# Patient Record
Sex: FEMALE | Race: WHITE | Employment: OTHER | URBAN - METROPOLITAN AREA
[De-identification: names, ages, dates, MRNs, and addresses within clinical notes are randomized per-mention and may not be internally consistent; named-entity substitution may affect disease eponyms.]

---

## 2017-01-12 ENCOUNTER — HOSPITAL ENCOUNTER (OUTPATIENT)
Dept: ULTRASOUND IMAGING | Age: 82
Discharge: HOME | End: 2017-01-12
Attending: NURSE PRACTITIONER | Admitting: NURSE PRACTITIONER

## 2017-01-12 ENCOUNTER — OFFICE VISIT (OUTPATIENT)
Dept: FAMILY MEDICINE CLINIC | Age: 82
End: 2017-01-12

## 2017-01-12 VITALS
BODY MASS INDEX: 26.36 KG/M2 | DIASTOLIC BLOOD PRESSURE: 60 MMHG | SYSTOLIC BLOOD PRESSURE: 122 MMHG | HEIGHT: 57 IN | WEIGHT: 122.2 LBS | OXYGEN SATURATION: 97 % | RESPIRATION RATE: 18 BRPM | HEART RATE: 93 BPM

## 2017-01-12 DIAGNOSIS — T50.905A MEDICATION ADVERSE EFFECT, INITIAL ENCOUNTER: Primary | ICD-10-CM

## 2017-01-12 DIAGNOSIS — R60.0 LOCALIZED EDEMA: ICD-10-CM

## 2017-01-12 PROCEDURE — 99214 OFFICE O/P EST MOD 30 MIN: CPT | Performed by: NURSE PRACTITIONER

## 2017-01-12 PROCEDURE — 96372 THER/PROPH/DIAG INJ SC/IM: CPT | Performed by: NURSE PRACTITIONER

## 2017-01-12 RX ORDER — METHYLPREDNISOLONE ACETATE 40 MG/ML
40 INJECTION, SUSPENSION INTRA-ARTICULAR; INTRALESIONAL; INTRAMUSCULAR; SOFT TISSUE ONCE
Status: COMPLETED | OUTPATIENT
Start: 2017-01-12 | End: 2017-01-12

## 2017-01-12 RX ADMIN — METHYLPREDNISOLONE ACETATE 40 MG: 40 INJECTION, SUSPENSION INTRA-ARTICULAR; INTRALESIONAL; INTRAMUSCULAR; SOFT TISSUE at 11:33

## 2017-01-12 ASSESSMENT — ENCOUNTER SYMPTOMS
SHORTNESS OF BREATH: 0
BACK PAIN: 0
NAUSEA: 0
VOMITING: 0
ABDOMINAL DISTENTION: 0

## 2017-01-13 ASSESSMENT — PATIENT HEALTH QUESTIONNAIRE - PHQ9
SUM OF ALL RESPONSES TO PHQ9 QUESTIONS 1 & 2: 0
2. FEELING DOWN, DEPRESSED OR HOPELESS: 0
1. LITTLE INTEREST OR PLEASURE IN DOING THINGS: 0
SUM OF ALL RESPONSES TO PHQ QUESTIONS 1-9: 0

## 2017-01-26 ENCOUNTER — OFFICE VISIT (OUTPATIENT)
Dept: FAMILY MEDICINE CLINIC | Age: 82
End: 2017-01-26

## 2017-01-26 VITALS
HEART RATE: 71 BPM | WEIGHT: 120 LBS | SYSTOLIC BLOOD PRESSURE: 120 MMHG | BODY MASS INDEX: 25.97 KG/M2 | OXYGEN SATURATION: 94 % | DIASTOLIC BLOOD PRESSURE: 74 MMHG | RESPIRATION RATE: 18 BRPM

## 2017-01-26 DIAGNOSIS — R60.0 LEG EDEMA, LEFT: Primary | ICD-10-CM

## 2017-01-26 DIAGNOSIS — M81.0 OSTEOPOROSIS: ICD-10-CM

## 2017-01-26 PROCEDURE — 99213 OFFICE O/P EST LOW 20 MIN: CPT | Performed by: NURSE PRACTITIONER

## 2017-01-26 ASSESSMENT — ENCOUNTER SYMPTOMS: SHORTNESS OF BREATH: 0

## 2017-03-08 ENCOUNTER — OFFICE VISIT (OUTPATIENT)
Dept: FAMILY MEDICINE CLINIC | Age: 82
End: 2017-03-08

## 2017-03-08 DIAGNOSIS — M81.0 OSTEOPOROSIS: Primary | ICD-10-CM

## 2017-03-08 DIAGNOSIS — Z12.11 SCREENING FOR COLON CANCER: ICD-10-CM

## 2017-03-08 PROCEDURE — 99214 OFFICE O/P EST MOD 30 MIN: CPT | Performed by: NURSE PRACTITIONER

## 2017-03-09 ENCOUNTER — TELEPHONE (OUTPATIENT)
Dept: FAMILY MEDICINE CLINIC | Age: 82
End: 2017-03-09

## 2017-03-09 VITALS
HEART RATE: 61 BPM | WEIGHT: 123 LBS | HEIGHT: 57 IN | OXYGEN SATURATION: 97 % | BODY MASS INDEX: 26.54 KG/M2 | DIASTOLIC BLOOD PRESSURE: 58 MMHG | SYSTOLIC BLOOD PRESSURE: 112 MMHG | RESPIRATION RATE: 17 BRPM

## 2017-03-09 ASSESSMENT — PATIENT HEALTH QUESTIONNAIRE - PHQ9
SUM OF ALL RESPONSES TO PHQ9 QUESTIONS 1 & 2: 0
1. LITTLE INTEREST OR PLEASURE IN DOING THINGS: 0
SUM OF ALL RESPONSES TO PHQ QUESTIONS 1-9: 0
2. FEELING DOWN, DEPRESSED OR HOPELESS: 0

## 2017-03-09 ASSESSMENT — ENCOUNTER SYMPTOMS
ABDOMINAL DISTENTION: 0
SHORTNESS OF BREATH: 0
BACK PAIN: 0
VOMITING: 0
NAUSEA: 0

## 2017-03-10 DIAGNOSIS — Z13.820 SCREENING FOR OSTEOPOROSIS: Primary | ICD-10-CM

## 2017-03-14 DIAGNOSIS — Z13.820 SCREENING FOR OSTEOPOROSIS: ICD-10-CM

## 2017-03-20 ENCOUNTER — TELEPHONE (OUTPATIENT)
Dept: FAMILY MEDICINE CLINIC | Age: 82
End: 2017-03-20

## 2017-03-20 DIAGNOSIS — Z12.11 SCREENING FOR COLON CANCER: ICD-10-CM

## 2017-03-20 LAB
CONTROL: ABNORMAL
HEMOCCULT STL QL: ABNORMAL

## 2017-03-20 PROCEDURE — 82274 ASSAY TEST FOR BLOOD FECAL: CPT | Performed by: NURSE PRACTITIONER

## 2017-03-22 DIAGNOSIS — R89.9 ABNORMAL LABORATORY TEST: Primary | ICD-10-CM

## 2017-03-23 RX ORDER — POTASSIUM CHLORIDE 20 MEQ/1
TABLET, EXTENDED RELEASE ORAL
Qty: 120 TABLET | Refills: 1 | Status: SHIPPED | OUTPATIENT
Start: 2017-03-23 | End: 2019-06-21

## 2017-03-27 ENCOUNTER — TELEPHONE (OUTPATIENT)
Dept: CARDIOLOGY CLINIC | Age: 82
End: 2017-03-27

## 2017-04-11 RX ORDER — FLUTICASONE PROPIONATE 50 MCG
SPRAY, SUSPENSION (ML) NASAL
Qty: 48 G | Refills: 1 | Status: SHIPPED | OUTPATIENT
Start: 2017-04-11 | End: 2020-05-07

## 2017-04-25 RX ORDER — LISINOPRIL 10 MG/1
TABLET ORAL
Qty: 90 TABLET | Refills: 10 | Status: SHIPPED | OUTPATIENT
Start: 2017-04-25 | End: 2019-11-05

## 2017-05-09 ENCOUNTER — OFFICE VISIT (OUTPATIENT)
Dept: CARDIOLOGY CLINIC | Age: 82
End: 2017-05-09

## 2017-05-09 VITALS
HEIGHT: 57 IN | BODY MASS INDEX: 26.71 KG/M2 | DIASTOLIC BLOOD PRESSURE: 78 MMHG | HEART RATE: 60 BPM | WEIGHT: 123.8 LBS | SYSTOLIC BLOOD PRESSURE: 116 MMHG

## 2017-05-09 DIAGNOSIS — I25.10 CORONARY ARTERY DISEASE INVOLVING NATIVE CORONARY ARTERY OF NATIVE HEART WITHOUT ANGINA PECTORIS: Primary | ICD-10-CM

## 2017-05-09 PROCEDURE — 99214 OFFICE O/P EST MOD 30 MIN: CPT | Performed by: INTERNAL MEDICINE

## 2017-05-15 ENCOUNTER — TELEPHONE (OUTPATIENT)
Dept: FAMILY MEDICINE CLINIC | Age: 82
End: 2017-05-15

## 2017-06-07 ENCOUNTER — OFFICE VISIT (OUTPATIENT)
Dept: FAMILY MEDICINE CLINIC | Age: 82
End: 2017-06-07

## 2017-06-07 VITALS
HEIGHT: 57 IN | OXYGEN SATURATION: 99 % | SYSTOLIC BLOOD PRESSURE: 118 MMHG | HEART RATE: 68 BPM | WEIGHT: 122.2 LBS | BODY MASS INDEX: 26.36 KG/M2 | DIASTOLIC BLOOD PRESSURE: 58 MMHG

## 2017-06-07 DIAGNOSIS — M25.552 CHRONIC ARTHRALGIAS OF KNEES AND HIPS: Primary | ICD-10-CM

## 2017-06-07 DIAGNOSIS — M25.562 CHRONIC ARTHRALGIAS OF KNEES AND HIPS: Primary | ICD-10-CM

## 2017-06-07 DIAGNOSIS — Z13.1 SCREENING FOR DIABETES MELLITUS: ICD-10-CM

## 2017-06-07 DIAGNOSIS — M25.551 CHRONIC ARTHRALGIAS OF KNEES AND HIPS: Primary | ICD-10-CM

## 2017-06-07 DIAGNOSIS — G89.29 CHRONIC ARTHRALGIAS OF KNEES AND HIPS: Primary | ICD-10-CM

## 2017-06-07 DIAGNOSIS — E78.5 HYPERLIPIDEMIA, UNSPECIFIED HYPERLIPIDEMIA TYPE: ICD-10-CM

## 2017-06-07 DIAGNOSIS — Z13.0 SCREENING FOR DEFICIENCY ANEMIA: ICD-10-CM

## 2017-06-07 DIAGNOSIS — M25.561 CHRONIC ARTHRALGIAS OF KNEES AND HIPS: Primary | ICD-10-CM

## 2017-06-07 PROCEDURE — 99214 OFFICE O/P EST MOD 30 MIN: CPT | Performed by: NURSE PRACTITIONER

## 2017-06-07 ASSESSMENT — ENCOUNTER SYMPTOMS
NAUSEA: 0
SHORTNESS OF BREATH: 0
ABDOMINAL DISTENTION: 0
VOMITING: 0
BACK PAIN: 1

## 2017-06-21 ENCOUNTER — OFFICE VISIT (OUTPATIENT)
Dept: FAMILY MEDICINE CLINIC | Age: 82
End: 2017-06-21

## 2017-06-21 VITALS
SYSTOLIC BLOOD PRESSURE: 124 MMHG | WEIGHT: 119.2 LBS | BODY MASS INDEX: 25.72 KG/M2 | RESPIRATION RATE: 18 BRPM | OXYGEN SATURATION: 96 % | HEIGHT: 57 IN | DIASTOLIC BLOOD PRESSURE: 70 MMHG | HEART RATE: 66 BPM

## 2017-06-21 DIAGNOSIS — R30.0 DYSURIA: ICD-10-CM

## 2017-06-21 DIAGNOSIS — N30.01 ACUTE CYSTITIS WITH HEMATURIA: Primary | ICD-10-CM

## 2017-06-21 LAB
BILIRUBIN, POC: ABNORMAL
BLOOD URINE, POC: ABNORMAL
CLARITY, POC: ABNORMAL
COLOR, POC: ABNORMAL
GLUCOSE URINE, POC: ABNORMAL
KETONES, POC: ABNORMAL
LEUKOCYTE EST, POC: ABNORMAL
NITRITE, POC: ABNORMAL
PH, POC: 6.5
PROTEIN, POC: ABNORMAL
SPECIFIC GRAVITY, POC: 1.01
UROBILINOGEN, POC: ABNORMAL

## 2017-06-21 PROCEDURE — 99214 OFFICE O/P EST MOD 30 MIN: CPT | Performed by: FAMILY MEDICINE

## 2017-06-21 PROCEDURE — 81002 URINALYSIS NONAUTO W/O SCOPE: CPT | Performed by: FAMILY MEDICINE

## 2017-06-21 RX ORDER — LEVOFLOXACIN 500 MG/1
500 TABLET, FILM COATED ORAL DAILY
Qty: 6 TABLET | Refills: 0 | Status: SHIPPED | OUTPATIENT
Start: 2017-06-21 | End: 2017-06-27

## 2017-06-21 ASSESSMENT — ENCOUNTER SYMPTOMS
COUGH: 0
NAUSEA: 1

## 2017-06-23 LAB — URINE CULTURE, ROUTINE: NORMAL

## 2017-06-26 RX ORDER — PANTOPRAZOLE SODIUM 40 MG/1
TABLET, DELAYED RELEASE ORAL
Qty: 90 TABLET | Refills: 1 | Status: SHIPPED | OUTPATIENT
Start: 2017-06-26 | End: 2018-05-04 | Stop reason: SDUPTHER

## 2017-07-06 ENCOUNTER — TELEPHONE (OUTPATIENT)
Dept: FAMILY MEDICINE CLINIC | Age: 82
End: 2017-07-06

## 2017-07-11 ENCOUNTER — TELEPHONE (OUTPATIENT)
Dept: FAMILY MEDICINE CLINIC | Age: 82
End: 2017-07-11

## 2017-07-11 ENCOUNTER — OFFICE VISIT (OUTPATIENT)
Dept: FAMILY MEDICINE CLINIC | Age: 82
End: 2017-07-11

## 2017-07-11 VITALS
OXYGEN SATURATION: 96 % | HEART RATE: 84 BPM | BODY MASS INDEX: 26.18 KG/M2 | SYSTOLIC BLOOD PRESSURE: 128 MMHG | WEIGHT: 121 LBS | DIASTOLIC BLOOD PRESSURE: 78 MMHG

## 2017-07-11 DIAGNOSIS — R31.9 HEMATURIA: ICD-10-CM

## 2017-07-11 DIAGNOSIS — R35.0 URINARY FREQUENCY: Primary | ICD-10-CM

## 2017-07-11 DIAGNOSIS — Z23 IMMUNIZATION DUE: ICD-10-CM

## 2017-07-11 LAB
BILIRUBIN, POC: NORMAL
BLOOD URINE, POC: NORMAL
CLARITY, POC: NORMAL
COLOR, POC: NORMAL
GLUCOSE URINE, POC: NORMAL
KETONES, POC: NORMAL
LEUKOCYTE EST, POC: NORMAL
NITRITE, POC: NORMAL
PH, POC: 6
PROTEIN, POC: NORMAL
SPECIFIC GRAVITY, POC: 1.02
UROBILINOGEN, POC: NORMAL

## 2017-07-11 PROCEDURE — 90471 IMMUNIZATION ADMIN: CPT | Performed by: NURSE PRACTITIONER

## 2017-07-11 PROCEDURE — 99214 OFFICE O/P EST MOD 30 MIN: CPT | Performed by: NURSE PRACTITIONER

## 2017-07-11 PROCEDURE — 90715 TDAP VACCINE 7 YRS/> IM: CPT | Performed by: NURSE PRACTITIONER

## 2017-07-11 PROCEDURE — 81002 URINALYSIS NONAUTO W/O SCOPE: CPT | Performed by: NURSE PRACTITIONER

## 2017-07-11 RX ORDER — SULFAMETHOXAZOLE AND TRIMETHOPRIM 800; 160 MG/1; MG/1
1 TABLET ORAL 2 TIMES DAILY
Qty: 14 TABLET | Refills: 0 | Status: SHIPPED | OUTPATIENT
Start: 2017-07-11 | End: 2017-07-13

## 2017-07-11 ASSESSMENT — ENCOUNTER SYMPTOMS
ABDOMINAL PAIN: 0
VOMITING: 0
SHORTNESS OF BREATH: 0
BACK PAIN: 0
NAUSEA: 0

## 2017-07-14 ENCOUNTER — TELEPHONE (OUTPATIENT)
Dept: FAMILY MEDICINE CLINIC | Age: 82
End: 2017-07-14

## 2017-07-14 LAB
ORGANISM: ABNORMAL
URINE CULTURE, ROUTINE: ABNORMAL

## 2017-07-17 RX ORDER — TETRACYCLINE HYDROCHLORIDE 250 MG/1
250 CAPSULE ORAL 2 TIMES DAILY
Qty: 14 CAPSULE | Refills: 0 | Status: SHIPPED | OUTPATIENT
Start: 2017-07-17 | End: 2019-06-21

## 2017-07-18 DIAGNOSIS — I50.9 CHRONIC CONGESTIVE HEART FAILURE, UNSPECIFIED CONGESTIVE HEART FAILURE TYPE: Primary | ICD-10-CM

## 2017-07-26 ENCOUNTER — OFFICE VISIT (OUTPATIENT)
Dept: FAMILY MEDICINE CLINIC | Age: 82
End: 2017-07-26

## 2017-07-26 VITALS
DIASTOLIC BLOOD PRESSURE: 64 MMHG | BODY MASS INDEX: 22.62 KG/M2 | WEIGHT: 112 LBS | RESPIRATION RATE: 17 BRPM | HEART RATE: 71 BPM | SYSTOLIC BLOOD PRESSURE: 116 MMHG | OXYGEN SATURATION: 98 %

## 2017-07-26 DIAGNOSIS — G89.29 CHRONIC BILATERAL LOW BACK PAIN, WITH SCIATICA PRESENCE UNSPECIFIED: Primary | ICD-10-CM

## 2017-07-26 DIAGNOSIS — M54.5 CHRONIC BILATERAL LOW BACK PAIN, WITH SCIATICA PRESENCE UNSPECIFIED: Primary | ICD-10-CM

## 2017-07-26 PROCEDURE — 99214 OFFICE O/P EST MOD 30 MIN: CPT | Performed by: NURSE PRACTITIONER

## 2017-07-26 RX ORDER — LIDOCAINE 50 MG/G
1 PATCH TOPICAL DAILY
Qty: 30 PATCH | Refills: 0 | Status: SHIPPED | OUTPATIENT
Start: 2017-07-26

## 2017-07-26 RX ORDER — HYDROCODONE BITARTRATE AND ACETAMINOPHEN 5; 325 MG/1; MG/1
TABLET ORAL
Qty: 5 TABLET | Refills: 0 | Status: SHIPPED | OUTPATIENT
Start: 2017-07-26 | End: 2019-06-21

## 2017-07-26 ASSESSMENT — ENCOUNTER SYMPTOMS
NAUSEA: 0
CONSTIPATION: 0
VOMITING: 0
BACK PAIN: 1
SINUS PRESSURE: 0
CHEST TIGHTNESS: 0
COUGH: 0
SHORTNESS OF BREATH: 0
EYE ITCHING: 0

## 2017-10-20 ENCOUNTER — HOSPITAL ENCOUNTER (OUTPATIENT)
Dept: GENERAL RADIOLOGY | Age: 82
Discharge: HOME | End: 2017-10-20
Attending: GENERAL PRACTICE | Admitting: GENERAL PRACTICE

## 2017-10-20 DIAGNOSIS — S22.081D BURST FRACTURE OF T12 VERTEBRA WITH ROUTINE HEALING: ICD-10-CM

## 2017-11-08 ENCOUNTER — TELEPHONE (OUTPATIENT)
Dept: CARDIOLOGY CLINIC | Age: 82
End: 2017-11-08

## 2017-11-30 ENCOUNTER — HOSPITAL ENCOUNTER (OUTPATIENT)
Dept: GENERAL RADIOLOGY | Age: 82
Discharge: HOME | End: 2017-11-30

## 2017-11-30 DIAGNOSIS — M43.25 FUSION OF SPINE OF THORACOLUMBAR REGION: ICD-10-CM

## 2018-04-26 ENCOUNTER — HOSPITAL ENCOUNTER (OUTPATIENT)
Dept: GENERAL RADIOLOGY | Age: 83
Discharge: HOME | End: 2018-04-26

## 2018-04-26 DIAGNOSIS — M43.25 FUSION OF SPINE OF THORACOLUMBAR REGION: ICD-10-CM

## 2018-05-04 ENCOUNTER — OFFICE VISIT (OUTPATIENT)
Dept: CARDIOLOGY CLINIC | Age: 83
End: 2018-05-04

## 2018-05-04 VITALS
BODY MASS INDEX: 26.57 KG/M2 | SYSTOLIC BLOOD PRESSURE: 118 MMHG | HEART RATE: 80 BPM | DIASTOLIC BLOOD PRESSURE: 60 MMHG | WEIGHT: 131.8 LBS | HEIGHT: 59 IN

## 2018-05-04 DIAGNOSIS — M79.89 SWELLING OF EXTREMITY: ICD-10-CM

## 2018-05-04 DIAGNOSIS — I35.1 AORTIC VALVE INSUFFICIENCY, ETIOLOGY OF CARDIAC VALVE DISEASE UNSPECIFIED: ICD-10-CM

## 2018-05-04 DIAGNOSIS — I25.10 CORONARY ARTERY DISEASE INVOLVING NATIVE HEART WITHOUT ANGINA PECTORIS, UNSPECIFIED VESSEL OR LESION TYPE: Primary | ICD-10-CM

## 2018-05-04 PROCEDURE — 4040F PNEUMOC VAC/ADMIN/RCVD: CPT | Performed by: INTERNAL MEDICINE

## 2018-05-04 PROCEDURE — G8427 DOCREV CUR MEDS BY ELIG CLIN: HCPCS | Performed by: INTERNAL MEDICINE

## 2018-05-04 PROCEDURE — 1036F TOBACCO NON-USER: CPT | Performed by: INTERNAL MEDICINE

## 2018-05-04 PROCEDURE — 1090F PRES/ABSN URINE INCON ASSESS: CPT | Performed by: INTERNAL MEDICINE

## 2018-05-04 PROCEDURE — G8598 ASA/ANTIPLAT THER USED: HCPCS | Performed by: INTERNAL MEDICINE

## 2018-05-04 PROCEDURE — G8417 CALC BMI ABV UP PARAM F/U: HCPCS | Performed by: INTERNAL MEDICINE

## 2018-05-04 PROCEDURE — 1123F ACP DISCUSS/DSCN MKR DOCD: CPT | Performed by: INTERNAL MEDICINE

## 2018-05-04 PROCEDURE — 99213 OFFICE O/P EST LOW 20 MIN: CPT | Performed by: INTERNAL MEDICINE

## 2018-05-14 ENCOUNTER — PROCEDURE VISIT (OUTPATIENT)
Dept: CARDIOLOGY CLINIC | Age: 83
End: 2018-05-14

## 2018-05-14 DIAGNOSIS — I25.10 CORONARY ARTERY DISEASE INVOLVING NATIVE HEART WITHOUT ANGINA PECTORIS, UNSPECIFIED VESSEL OR LESION TYPE: ICD-10-CM

## 2018-05-14 DIAGNOSIS — I35.1 AORTIC VALVE INSUFFICIENCY, ETIOLOGY OF CARDIAC VALVE DISEASE UNSPECIFIED: Primary | ICD-10-CM

## 2018-05-14 DIAGNOSIS — M79.89 SWELLING OF EXTREMITY: Primary | ICD-10-CM

## 2018-05-14 DIAGNOSIS — I35.1 AORTIC VALVE INSUFFICIENCY, ETIOLOGY OF CARDIAC VALVE DISEASE UNSPECIFIED: ICD-10-CM

## 2018-05-14 LAB
LV EF: 55 %
LVEF MODALITY: NORMAL

## 2018-05-14 PROCEDURE — 93971 EXTREMITY STUDY: CPT | Performed by: INTERNAL MEDICINE

## 2018-05-14 PROCEDURE — 93306 TTE W/DOPPLER COMPLETE: CPT | Performed by: INTERNAL MEDICINE

## 2018-05-17 ENCOUNTER — TELEPHONE (OUTPATIENT)
Dept: CARDIOLOGY CLINIC | Age: 83
End: 2018-05-17

## 2018-07-26 ENCOUNTER — HOSPITAL ENCOUNTER (OUTPATIENT)
Dept: GENERAL RADIOLOGY | Age: 83
Discharge: HOME | End: 2018-07-26

## 2018-07-26 DIAGNOSIS — M43.25 FUSION OF SPINE OF THORACOLUMBAR REGION: ICD-10-CM

## 2019-06-21 ENCOUNTER — APPOINTMENT (OUTPATIENT)
Dept: CT IMAGING | Age: 84
End: 2019-06-21
Payer: MEDICARE

## 2019-06-21 ENCOUNTER — APPOINTMENT (OUTPATIENT)
Dept: GENERAL RADIOLOGY | Age: 84
End: 2019-06-21
Payer: MEDICARE

## 2019-06-21 ENCOUNTER — HOSPITAL ENCOUNTER (EMERGENCY)
Age: 84
Discharge: HOME | End: 2019-06-22
Attending: EMERGENCY MEDICINE
Payer: MEDICARE

## 2019-06-21 VITALS
OXYGEN SATURATION: 94 % | DIASTOLIC BLOOD PRESSURE: 93 MMHG | HEIGHT: 59 IN | WEIGHT: 131 LBS | SYSTOLIC BLOOD PRESSURE: 124 MMHG | RESPIRATION RATE: 16 BRPM | HEART RATE: 92 BPM | TEMPERATURE: 98.8 F | BODY MASS INDEX: 26.41 KG/M2

## 2019-06-21 DIAGNOSIS — S80.01XA TRAUMATIC HEMATOMA OF RIGHT KNEE, INITIAL ENCOUNTER: ICD-10-CM
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
## 2019-06-21 LAB
ALBUMIN SERPL-MCNC: 3.4 GM/DL (ref 3.4–5)
ALP BLD-CCNC: 73 IU/L (ref 40–128)
ALT SERPL-CCNC: 22 U/L (ref 10–40)
ANION GAP SERPL CALCULATED.3IONS-SCNC: 8 MMOL/L (ref 4–16)
AST SERPL-CCNC: 21 IU/L (ref 15–37)
BASOPHILS ABSOLUTE: 0.1 K/CU MM
BASOPHILS RELATIVE PERCENT: 0.6 % (ref 0–1)
BILIRUB SERPL-MCNC: 0.5 MG/DL (ref 0–1)
BUN BLDV-MCNC: 21 MG/DL (ref 6–23)
CALCIUM SERPL-MCNC: 9.1 MG/DL (ref 8.3–10.6)
CHLORIDE BLD-SCNC: 105 MMOL/L (ref 99–110)
CO2: 24 MMOL/L (ref 21–32)
CREAT SERPL-MCNC: 0.8 MG/DL (ref 0.6–1.1)
DIFFERENTIAL TYPE: ABNORMAL
EOSINOPHILS ABSOLUTE: 0.3 K/CU MM
EOSINOPHILS RELATIVE PERCENT: 3.7 % (ref 0–3)
GFR AFRICAN AMERICAN: >60 ML/MIN/1.73M2
GFR NON-AFRICAN AMERICAN: >60 ML/MIN/1.73M2
GLUCOSE BLD-MCNC: 165 MG/DL (ref 70–99)
HCT VFR BLD CALC: 36.4 % (ref 37–47)
HEMOGLOBIN: 11.6 GM/DL (ref 12.5–16)
IMMATURE NEUTROPHIL %: 0.1 % (ref 0–0.43)
LYMPHOCYTES ABSOLUTE: 1.7 K/CU MM
LYMPHOCYTES RELATIVE PERCENT: 20.2 % (ref 24–44)
MCH RBC QN AUTO: 29.5 PG (ref 27–31)
MCHC RBC AUTO-ENTMCNC: 31.9 % (ref 32–36)
MCV RBC AUTO: 92.6 FL (ref 78–100)
MONOCYTES ABSOLUTE: 0.6 K/CU MM
MONOCYTES RELATIVE PERCENT: 7.2 % (ref 0–4)
NUCLEATED RBC %: 0 %
PDW BLD-RTO: 14.5 % (ref 11.7–14.9)
PLATELET # BLD: 211 K/CU MM (ref 140–440)
PMV BLD AUTO: 10 FL (ref 7.5–11.1)
POTASSIUM SERPL-SCNC: 4.4 MMOL/L (ref 3.5–5.1)
RBC # BLD: 3.93 M/CU MM (ref 4.2–5.4)
SEGMENTED NEUTROPHILS ABSOLUTE COUNT: 5.7 K/CU MM
SEGMENTED NEUTROPHILS RELATIVE PERCENT: 68.2 % (ref 36–66)
SODIUM BLD-SCNC: 137 MMOL/L (ref 135–145)
TOTAL IMMATURE NEUTOROPHIL: 0.01 K/CU MM
TOTAL NUCLEATED RBC: 0 K/CU MM
TOTAL PROTEIN: 5.9 GM/DL (ref 6.4–8.2)
WBC # BLD: 8.4 K/CU MM (ref 4–10.5)

## 2019-06-21 PROCEDURE — 80053 COMPREHEN METABOLIC PANEL: CPT

## 2019-06-21 PROCEDURE — 73562 X-RAY EXAM OF KNEE 3: CPT

## 2019-06-21 PROCEDURE — 70450 CT HEAD/BRAIN W/O DYE: CPT

## 2019-06-21 PROCEDURE — 72125 CT NECK SPINE W/O DYE: CPT

## 2019-06-21 PROCEDURE — 73552 X-RAY EXAM OF FEMUR 2/>: CPT

## 2019-06-21 PROCEDURE — 99284 EMERGENCY DEPT VISIT MOD MDM: CPT

## 2019-06-21 PROCEDURE — 73590 X-RAY EXAM OF LOWER LEG: CPT

## 2019-06-21 PROCEDURE — 96375 TX/PRO/DX INJ NEW DRUG ADDON: CPT

## 2019-06-21 PROCEDURE — 6360000002 HC RX W HCPCS: Performed by: PHYSICIAN ASSISTANT

## 2019-06-21 PROCEDURE — 85025 COMPLETE CBC W/AUTO DIFF WBC: CPT

## 2019-06-21 PROCEDURE — 73501 X-RAY EXAM HIP UNI 1 VIEW: CPT

## 2019-06-21 PROCEDURE — 73700 CT LOWER EXTREMITY W/O DYE: CPT

## 2019-06-21 PROCEDURE — 96374 THER/PROPH/DIAG INJ IV PUSH: CPT

## 2019-06-21 RX ORDER — FUROSEMIDE 20 MG/1
20 TABLET ORAL DAILY
COMMUNITY

## 2019-06-21 RX ORDER — NYSTATIN 100000 [USP'U]/G
POWDER TOPICAL EVERY 12 HOURS PRN
COMMUNITY

## 2019-06-21 RX ORDER — ALUMINA, MAGNESIA, AND SIMETHICONE 2400; 2400; 240 MG/30ML; MG/30ML; MG/30ML
30 SUSPENSION ORAL EVERY 6 HOURS PRN
COMMUNITY

## 2019-06-21 RX ORDER — ONDANSETRON 2 MG/ML
4 INJECTION INTRAMUSCULAR; INTRAVENOUS ONCE
Status: COMPLETED | OUTPATIENT
Start: 2019-06-21 | End: 2019-06-21

## 2019-06-21 RX ORDER — CYCLOBENZAPRINE HCL 5 MG
5 TABLET ORAL EVERY 8 HOURS PRN
COMMUNITY
End: 2019-11-05

## 2019-06-21 RX ORDER — DOCUSATE SODIUM 100 MG/1
100 CAPSULE, LIQUID FILLED ORAL DAILY
COMMUNITY

## 2019-06-21 RX ORDER — FERROUS SULFATE 325(65) MG
325 TABLET ORAL
COMMUNITY

## 2019-06-21 RX ORDER — GABAPENTIN 100 MG/1
100 CAPSULE ORAL DAILY
COMMUNITY
End: 2019-11-05

## 2019-06-21 RX ORDER — RISEDRONATE SODIUM 35 MG/1
35 TABLET, FILM COATED ORAL
COMMUNITY

## 2019-06-21 RX ORDER — MORPHINE SULFATE 4 MG/ML
2 INJECTION, SOLUTION INTRAMUSCULAR; INTRAVENOUS ONCE
Status: COMPLETED | OUTPATIENT
Start: 2019-06-21 | End: 2019-06-21

## 2019-06-21 RX ORDER — ATORVASTATIN CALCIUM 40 MG/1
40 TABLET, FILM COATED ORAL NIGHTLY
COMMUNITY

## 2019-06-21 RX ADMIN — ONDANSETRON 4 MG: 2 INJECTION INTRAMUSCULAR; INTRAVENOUS at 18:11

## 2019-06-21 RX ADMIN — MORPHINE SULFATE 2 MG: 4 INJECTION INTRAVENOUS at 18:10

## 2019-06-21 ASSESSMENT — PAIN DESCRIPTION - PAIN TYPE: TYPE: ACUTE PAIN

## 2019-06-21 ASSESSMENT — PAIN SCALES - GENERAL: PAINLEVEL_OUTOF10: 10

## 2019-06-21 ASSESSMENT — PAIN DESCRIPTION - LOCATION: LOCATION: KNEE

## 2019-06-21 ASSESSMENT — PAIN DESCRIPTION - ORIENTATION: ORIENTATION: RIGHT

## 2019-06-21 NOTE — ED PROVIDER NOTES
right knee. Results were discussed with the patient and her daughter. The patient resides at [facility] and daughter would like her to return there is possible. Case Management was consulted and confirmed that [facility] can care for the patient with this injury. The patient will be discharged back to [facility]. Stable for outpatient management with close follow up. Return to the ED for worsening symptoms. All diagnostic, treatment, and disposition decisions were made by myself in conjunction with the advanced practice provider. For all further details of the patient's emergency department visit, please see the advanced practice provider's documentation. Comment: Please note this report has been produced using speech recognition software and may contain errors related to that system including errors in grammar, punctuation, and spelling, as well as words and phrases that may be inappropriate. If there are any questions or concerns please feel free to contact the dictating provider for clarification.        Karen Bales MD  06/23/19 9292

## 2019-06-21 NOTE — ED NOTES
Bed: ED-17  Expected date:   Expected time:   Means of arrival:   Comments:  Jarad Martin RN  06/21/19 7251

## 2019-06-21 NOTE — ED NOTES
EXAMINATION:   3 XRAY VIEWS OF THE RIGHT KNEE       6/21/2019 5:22 pm       COMPARISON:   None.       HISTORY:   ORDERING SYSTEM PROVIDED HISTORY: injury   TECHNOLOGIST PROVIDED HISTORY:   PORTABLE   Reason for exam:->injury   Ordering Physician Provided Reason for Exam: fall   Acuity: Acute   Type of Exam: Initial       FINDINGS:   Possible nondisplaced avulsion fracture superior margin of the lateral tibial   spine.  Other osseous structures of the right knee appear intact and align   normally.  Mild medial and patellofemoral joint space narrowing.  Prominent   soft tissue edema superficial to the patella.  Vascular calcifications are   noted reflecting calcific atherosclerosis.         Impression   1. Possible avulsion fracture superior margin of the lateral tibial spine   which can be seen with internal derangement of the knee.  MRI correlation may   be indicated. 2. Prepatellar bursitis/cellulitis presumably posttraumatic in nature. 3. Mild osteoarthritis.           Shannon Delgado RN  06/21/19 3851

## 2019-06-21 NOTE — PROGRESS NOTES
Medication History  [facility]    Patient Name: Josie Moore 3/7/1930     Medication history has been completed by: Shameka Frausto CPhT    Source(s) of information: [facility], [facility]    Primary Care Physician: [address]: [address] [facility]    Allergies as of 06/21/2019 - Review Complete 06/21/2019   Allergen Reaction Noted    Ibuprofen Itching 01/12/2017    Antivert [meclizine hcl]  12/27/2011    Bactrim [sulfamethoxazole-trimethoprim]  06/21/2019    Codeine  12/27/2011    Erythromycin      Macrobid [nitrofurantoin macrocrystal]  10/02/2014    Niacin and related  12/27/2011    Seldane [terfenadine]  12/27/2011    Sulfa antibiotics  05/04/2018    Tramadol Other (See Comments) 07/27/2017    Ultram [tramadol hcl]  12/27/2011    Aspirin Other (See Comments) 03/24/2013        Prior to Admission medications    Medication Sig Start Date End Date Taking? Authorizing Provider   vitamin D (CHOLECALCIFEROL) 37223 UNIT CAPS Take 50,000 Units by mouth every 30 days   Yes Historical Provider, MD   docusate sodium (COLACE) 100 MG capsule Take 100 mg by mouth Daily   Yes Historical Provider, MD   ferrous sulfate 325 (65 Fe) MG tablet Take 325 mg by mouth daily (with breakfast)   Yes Historical Provider, MD   furosemide (LASIX) 20 MG tablet Take 20 mg by mouth daily   Yes Historical Provider, MD   gabapentin (NEURONTIN) 100 MG capsule Take 100 mg by mouth Daily.    Yes Historical Provider, MD   Multiple Vitamins-Minerals (ICAPS PO) Take 1 each by mouth 2 times daily   Yes Historical Provider, MD   LACTOBACILLUS PO Take 1 each by mouth 2 times daily   Yes Historical Provider, MD   atorvastatin (LIPITOR) 40 MG tablet Take 40 mg by mouth nightly   Yes Historical Provider, MD   risedronate (ACTONEL) 35 MG tablet Take 35 mg by mouth every 7 days   Yes Historical Provider, MD   lisinopril (PRINIVIL;ZESTRIL) 10 MG tablet TAKE 1 TABLET BY MOUTH DAILY 4/25/17  Yes Geronimo Sharp, APRN - CNP   fluticasone (FLONASE) 50 MCG/ACT nasal spray INHALE 1 SPRAY INTO EACH NOSTRIL DAILY 4/11/17  Yes MERRICK Charles CNP   pantoprazole (PROTONIX) 40 MG tablet Take 1 tablet by mouth daily 9/21/16  Yes Alfredo H TREMAINE Alan   clopidogrel (PLAVIX) 75 MG tablet Take 1 tablet by mouth daily 9/21/16  Yes Alfredo H TREMAINE Alan   carvedilol (COREG) 6.25 MG tablet Take 1 tablet by mouth 2 times daily (with meals) 9/21/16  Yes Alfredo H TREMAINE Alan   loratadine (CLARITIN) 10 MG tablet Take 1 tablet by mouth daily 7/27/16  Yes MERRICK Charles CNP   acetaminophen (TYLENOL ARTHRITIS PAIN) 650 MG CR tablet Take 650 mg by mouth every 6 hours as needed for Pain    Yes Historical Provider, MD   calcium carbonate 600 MG TABS tablet Take 1 tablet by mouth 2 times daily    Yes Historical Provider, MD   aspirin 81 MG chewable tablet Take 81 mg by mouth daily    Yes Historical Provider, MD   vitamin B-12 (CYANOCOBALAMIN) 500 MCG tablet Take 500 mcg by mouth daily    Yes Historical Provider, MD   aluminum & magnesium hydroxide-simethicone (MYLANTA) 400-400-40 MG/5ML SUSP Take 30 mLs by mouth every 6 hours as needed    Historical Provider, MD   cyclobenzaprine (FLEXERIL) 5 MG tablet Take 5 mg by mouth every 8 hours as needed for Muscle spasms    Historical Provider, MD   nystatin (MYCOSTATIN) 492137 UNIT/GM powder Apply topically every 12 hours as needed (yeast)    Historical Provider, MD   polyethyl glycol-propyl glycol 0.4-0.3 % (SYSTANE) 0.4-0.3 % ophthalmic solution Place 2 drops into both eyes as needed for Dry Eyes    Historical Provider, MD   lidocaine (LIDODERM) 5 % Place 1 patch onto the skin daily 12 hours on, 12 hours off. 7/26/17   MERRICK Charles CNP       Medications added or changed (ex.  new medication, dosage change, interval change, formulation change):  Tylenol frequency changed to q6 prn from q8 prn  Mylanta new medication  Calcium frequency changed to bid from qd  Vitamin D dose changed to 50,000 units monthly from 1000 units qd  Docusate new medication  Vitamin B-12 dose changed to 500 mcg qd from 50 mcg qd  Flexeril new medication  Ferrous sulfate new medication  Lasix dose changed to 20 mg qd from 40 mg qd  Gabapentin new medication  Icaps new medication  Lactobacillus (probiotic clarified)  Atorvastatin dose changed to 40 mg qd from 20 mg qd  Nystatin powder new therapy  Actonel new medication  Systane new therapy    Medications removed from list (include reason, ex. noncompliance, medication cost, therapy complete etc.):   Cranberry plus no longer taking  Norco no longer taking  Hydrocortisone no longer using  Colon cleanse no longer taking  Multivitamin no longer taking  Fish oil no longer taking  Potassium no longer taking  Sulfatrim no longer taking  Tetracycline no longer taking    Other Comments:  Reviewed and updated med list and allergy list per facility list provided    To my knowledge the above medication history is accurate as of 6/21/2019 6:07 PM.   Kyle Hartley CPhT   6/21/2019 6:07 PM

## 2019-06-21 NOTE — ED TRIAGE NOTES
Pt presents to the ED today via EMS after having a fall and landed on right knee. EMS reports that pt tripped while using her walker and landed on her right knee at [phone] today.

## 2019-06-22 NOTE — ED NOTES
Lunch box given as requested with grape juice   Daughter at bedside      Misael Reyes RN  06/21/19 7620

## 2019-06-22 NOTE — CARE COORDINATION
CM consult per Dr Nora Paul to initiate discharge planning. Pt lives at [facility]. Pt fell while ambulating with walker injuring her knee, no fracture. Into see pt and daughter at bed side pt alert and oriented and talkative wanting to know what is going on with her care and discharge back to [facility]. Pt has [facility] and [employer]. Call to [facility] # [phone] to enquire about pt returning to long term care unit, unable to get up on her one, no fracture. [facility] states PT/OT can be done in the long term care facility. States just need Order placed for PT/OT therapy. Update to Dr Nora Paul that pt can return to her same long term care area where she normally resides, Order for PT/OT therapy needed. Nursing Supervisor called to get report on pt, updated on plan to discharge, will need assistance to stand and ambulate. Order for PT/OT therapy. Update to pt and daughter of POC all questions have been answered for discharge, verbalized understanding from pt and daughter. Order and packet faxed over to [facility] # [phone]. Report called to [facility] where pt resides. Nurse ask for order for ace wrap, Dr Nora Paul ordered ace wrap to right knee PRN for comfort. Order printed and sent with AVS.    Plan for discharge back to [facility], [facility] long term care. Transport called to transport pt back to [facility].  MAGGY,RN/CM

## 2019-06-22 NOTE — ED PROVIDER NOTES
eMERGENCY dEPARTMENT eNCOUnter      PCP: Charis Pride    CHIEF COMPLAINT    Chief Complaint   Patient presents with   [facility] Fall     tripped with walker    Knee Pain     right knee pain; pt landed on knee when she fell       HPI    Ky Hernández is a 80 y.o. female who presents via EMS form [facility] with right knee pain after fall. Onset was shortly prior to arrival around 3:45pm today. The reason why the patient fell (context) was patient reports that she was using her walker and pivoted to sit down when she lost her balance causing her to fall and landed on her right knee. Patient denies symptoms preceding fall. Patient denies any her head denies any loss of consciousness. The patient complains of right knee pain that radiates into her right thigh and right mid shin. The quality is sharp. The patient has has associated bruising and swelling of right knee and right thigh. The fall was mechanical in nature without preceding symptoms. Patient is on aspirin and Plavix. Patient denies head injury, headache, numbness, weakness, tingling, other injuries, chest pain, abdominal pain. REVIEW OF SYSTEMS    General: No fever  ENT:  No visual changes. No headache. Cardiac: No Chest Pain, no syncope  Respiratory: No cough or difficulty breathing  GI: No vomiting. No Bloody Stool or Diarrhea  : No Dysuria or Hematuria  MSKTL:  See HPI. No neck or back pain. Neurologic: No LOC, no headache, dizziness, confusion.   No hearing loss    See HPI and nursing notes for additional information     PAST MEDICAL & SURGICAL HISTORY    Past Medical History:   Diagnosis Date    Allergic rhinitis     Angina pectoris ([address]     Aortic regurgitation     Mod to severe aortic regurg    Arthritis     CAD (coronary artery disease)     CHF (congestive heart failure) (HCC)     Chronic back pain     Dyslipidemia     GERD (gastroesophageal reflux disease)     H/O cardiovascular stress test 9/22/10    Cardiolite: Large Apical MI, associate mild EF 62 %    H/O cardiovascular stress test 10/29/2012    EF 68%, small in size and mild intensity perfusion defect in the apical wall    H/O CT scan of abdomen 9/13    Aortic atherosclerosis present w/subtle 2.0 cm infrarenal abd.aortic aneurysm.  H/O Doppler ultrasound 9/22/10    Carotid: estimated stenosis->50 % B/L    H/O Doppler ultrasound 10/13    Abd.U/S-interpretation=Cholelithiasis    H/O Doppler ultrasound 10/29/14    Arterial: No significant stenosis bilaterally.  H/O Doppler ultrasound 05/14/2018    venous     H/O echocardiogram 10/11, 9/10, 9/09    Echo: EF 65 %, normal LVSF, mod concentric LV hypertrophy, mod LVD dysfunction, Rt vent enlargement, Mild mitral regurg and mod aortic regurg,     H/O echocardiogram 10/29/12    EF 55%, normal lv systolic function, mild tricuspid regurg    H/O echocardiogram 08/13/15    EF 60% Mildly hypertrophied LV. Sclerotic aortic valve. Mild AR.  History of PTCA 2006    Stent to LAD.    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    Migraine     Mitral regurgitation 9/22/10    mild to mod MR, stress cardiolite: Large apical MI,  Associate mild  EF 62%    Muscle weakness (generalized)     chronic-Per Candy Maddie progress note 8/22/2012    Orthostatic hypotension     Osteoarthritis     Osteoporosis     Renal disease     Restless legs syndrome     S/P pericardiocentesis 2006     Past Surgical History:   Procedure Laterality Date    CATARACT REMOVAL      b/l    CORONARY ANGIOPLASTY WITH STENT PLACEMENT  2006    stent to LAD    HYSTERECTOMY      PERICARDIUM SURGERY  9/2006    pericardial window and pericardiocentesis CURRENT MEDICATIONS    Current Outpatient Rx   Medication Sig Dispense Refill    vitamin D (CHOLECALCIFEROL) 29869 UNIT CAPS Take 50,000 Units by mouth every 30 days      docusate sodium (COLACE) 100 MG capsule Take 100 mg by mouth Daily      ferrous sulfate 325 (65 Fe) MG tablet Take 325 mg by mouth daily (with breakfast)      furosemide (LASIX) 20 MG tablet Take 20 mg by mouth daily      gabapentin (NEURONTIN) 100 MG capsule Take 100 mg by mouth Daily.       Multiple Vitamins-Minerals (ICAPS PO) Take 1 each by mouth 2 times daily      LACTOBACILLUS PO Take 1 each by mouth 2 times daily      atorvastatin (LIPITOR) 40 MG tablet Take 40 mg by mouth nightly      risedronate (ACTONEL) 35 MG tablet Take 35 mg by mouth every 7 days      lisinopril (PRINIVIL;ZESTRIL) 10 MG tablet TAKE 1 TABLET BY MOUTH DAILY 90 tablet 10    fluticasone (FLONASE) 50 MCG/ACT nasal spray INHALE 1 SPRAY INTO EACH NOSTRIL DAILY 48 g 1    pantoprazole (PROTONIX) 40 MG tablet Take 1 tablet by mouth daily 90 tablet 1    clopidogrel (PLAVIX) 75 MG tablet Take 1 tablet by mouth daily 90 tablet 1    carvedilol (COREG) 6.25 MG tablet Take 1 tablet by mouth 2 times daily (with meals) 180 tablet 1    loratadine (CLARITIN) 10 MG tablet Take 1 tablet by mouth daily 90 tablet 1    acetaminophen (TYLENOL ARTHRITIS PAIN) 650 MG CR tablet Take 650 mg by mouth every 6 hours as needed for Pain       calcium carbonate 600 MG TABS tablet Take 1 tablet by mouth 2 times daily       aspirin 81 MG chewable tablet Take 81 mg by mouth daily       vitamin B-12 (CYANOCOBALAMIN) 500 MCG tablet Take 500 mcg by mouth daily       aluminum & magnesium hydroxide-simethicone (MYLANTA) 400-400-40 MG/5ML SUSP Take 30 mLs by mouth every 6 hours as needed      cyclobenzaprine (FLEXERIL) 5 MG tablet Take 5 mg by mouth every 8 hours as needed for Muscle spasms      nystatin (MYCOSTATIN) 736144 UNIT/GM powder Apply topically every 12 hours as needed (yeast)  polyethyl glycol-propyl glycol 0.4-0.3 % (SYSTANE) 0.4-0.3 % ophthalmic solution Place 2 drops into both eyes as needed for Dry Eyes      lidocaine (LIDODERM) 5 % Place 1 patch onto the skin daily 12 hours on, 12 hours off. 30 patch 0       ALLERGIES    Allergies   Allergen Reactions    Ibuprofen Itching    Antivert [Meclizine Hcl]     Bactrim [Sulfamethoxazole-Trimethoprim]     Codeine     Erythromycin     Macrobid [Nitrofurantoin Macrocrystal]     Niacin And Related     Seldane [Terfenadine]     Sulfa Antibiotics     Tramadol Other (See Comments)    Ultram [Tramadol Hcl]     Aspirin Other (See Comments)     Pt cannot have ASA in high doses. SOCIAL & FAMILY HISTORY    Social History     Socioeconomic History    Marital status: Single     Spouse name: None    Number of children: None    Years of education: None    Highest education level: None   Occupational History    None   Social Needs    Financial resource strain: None    Food insecurity:     Worry: None     Inability: None    Transportation needs:     Medical: None     Non-medical: None   Tobacco Use    Smoking status: Never Smoker    Smokeless tobacco: Never Used   Substance and Sexual Activity    Alcohol use: No    Drug use: No    Sexual activity: None   Lifestyle    Physical activity:     Days per week: None     Minutes per session: None    Stress: None   Relationships    Social connections:     Talks on phone: None     Gets together: None     Attends Evangelical service: None     Active member of club or organization: None     Attends meetings of clubs or organizations: None     Relationship status: None    Intimate partner violence:     Fear of current or ex partner: None     Emotionally abused: None     Physically abused: None     Forced sexual activity: None   Other Topics Concern    None   Social History Narrative    None     History reviewed. No pertinent family history.     PHYSICAL EXAM    VITAL SIGNS: BP (!) Integument:  Well hydrated, no petechiae   Neurologic:   Alert & oriented, No focal deficits noted. Cranial nerves II through XII grossly intact. Normal gross motor coordination & motor strength bilateral upper and lower extremities, upper and lower extremity DTRs intact. Sensation intact.   Psych: Pleasant affect, no hallucinations        LABS:  Results for orders placed or performed during the hospital encounter of 06/21/19   CBC Auto Differential   Result Value Ref Range    WBC 8.4 4.0 - 10.5 K/CU MM    RBC 3.93 (L) 4.2 - 5.4 M/CU MM    Hemoglobin 11.6 (L) 12.5 - 16.0 GM/DL    Hematocrit 36.4 (L) 37 - 47 %    MCV 92.6 78 - 100 FL    MCH 29.5 27 - 31 PG    MCHC 31.9 (L) 32.0 - 36.0 %    RDW 14.5 11.7 - 14.9 %    Platelets 794 859 - 052 K/CU MM    MPV 10.0 7.5 - 11.1 FL    Differential Type AUTOMATED DIFFERENTIAL     Segs Relative 68.2 (H) 36 - 66 %    Lymphocytes % 20.2 (L) 24 - 44 %    Monocytes % 7.2 (H) 0 - 4 %    Eosinophils % 3.7 (H) 0 - 3 %    Basophils % 0.6 0 - 1 %    Segs Absolute 5.7 K/CU MM    Lymphocytes # 1.7 K/CU MM    Monocytes # 0.6 K/CU MM    Eosinophils # 0.3 K/CU MM    Basophils # 0.1 K/CU MM    Nucleated RBC % 0.0 %    Total Nucleated RBC 0.0 K/CU MM    Total Immature Neutrophil 0.01 K/CU MM    Immature Neutrophil % 0.1 0 - 0.43 %   Comprehensive Metabolic Panel   Result Value Ref Range    Sodium 137 135 - 145 MMOL/L    Potassium 4.4 3.5 - 5.1 MMOL/L    Chloride 105 99 - 110 mMol/L    CO2 24 21 - 32 MMOL/L    BUN 21 6 - 23 MG/DL    CREATININE 0.8 0.6 - 1.1 MG/DL    Glucose 165 (H) 70 - 99 MG/DL    Calcium 9.1 8.3 - 10.6 MG/DL    Alb 3.4 3.4 - 5.0 GM/DL    Total Protein 5.9 (L) 6.4 - 8.2 GM/DL    Total Bilirubin 0.5 0.0 - 1.0 MG/DL    ALT 22 10 - 40 U/L    AST 21 15 - 37 IU/L    Alkaline Phosphatase 73 40 - 128 IU/L    GFR Non-African American >60 >60 mL/min/1.73m2    GFR African American >60 >60 mL/min/1.73m2    Anion Gap 8 4 - 16           RADIOLOGY     CT KNEE RIGHT WO CONTRAST   Final Result   1. No acute osseous abnormality of the right knee identified. 2. Hyperattenuating somewhat organized fluid within the anteromedial soft   tissues of the distal thigh and knee most compatible with large hematoma. This measures 2.4 x 8.4 x 13.6 cm along the imaged portion. This does extend   proximally beyond the field-of-view. Surrounding subcutaneous edema. 3. Mild-to-moderate tricompartmental osteoarthritis of the knee most   pronounced within the medial compartment. 4. Osteopenia. XR HIP 1 VW W PELVIS RIGHT   Final Result   No evidence of an acute fracture or dislocation of the right hip/femur. XR FEMUR RIGHT (MIN 2 VIEWS)   Final Result   No evidence of an acute fracture or dislocation of the right hip/femur. XR TIBIA FIBULA RIGHT (2 VIEWS)   Final Result   Overlying artifact proximally greatly degrades study quality. No definite acute fracture. Correlate with radiographs right knee performed earlier describing possible   lateral tibial spine avulsion fracture. The      Note that if pain persists or worsens, follow-up imaging may be indicated. XR KNEE RIGHT (3 VIEWS)   Final Result   1. Possible avulsion fracture superior margin of the lateral tibial spine   which can be seen with internal derangement of the knee. MRI correlation may   be indicated. 2. Prepatellar bursitis/cellulitis presumably posttraumatic in nature. 3. Mild osteoarthritis. CT Cervical Spine WO Contrast   Final Result   No acute intracranial abnormality. Degenerative changes involving the cervical spine described above. No cervical fracture or definite acute traumatic subluxation. Grade 1 degenerative anterolisthesis C4 on C5 and C5 on C6 with normally   aligned facet joints. Prominent atlantoaxial rotation is typically positional in nature but can be   seen with atlantoaxial subluxation.       Note that if pain persists or worsens, or if clinically Discussed possibility of internal derangement of right knee with patient/patient's daughter today. Rest of right lower extremity and right hip imaging without acute osseous abnormality. Right lower extremity is distally neurovascularly intact. Compartments are soft and right lower extremity. Case management consultation ordered for discharge planning assistance-see case management's note for further details. Patient's pain was controlled with morphine in the ED and Ace wrap applied to right knee for comfort for patient. Patient is nontoxic-appearing. Vital signs are stable. Patient stable for outpatient management at this time. Suspect patient's symptoms are secondary to traumatic hematoma of right knee as well as possible internal derangement after mechanical fall. All pertinent Lab data and radiographic results reviewed with patient at bedside. The patient and/or the family were informed of the results of any tests/labs/imaging, the treatment plan, and time was allotted to answer questions. Diagnosis, disposition, and treatment plan reviewed in detail with patient. Patient understands and agrees to follow-up with Dr. Jacquelin Paiz for recheck in 2 days. Patient understands and agrees to return to emergency department for any new or worsening symptoms - strict return precautions given. Clinical  IMPRESSION    1. Fall, initial encounter    2.  Traumatic hematoma of right knee, initial encounter          Disposition referral (if applicable):  Janet Franklin  [address] [place] [address] Drive [address] [phone]    Schedule an appointment as soon as possible for a visit in 2 days      [facility] Emergency Department  [phone]  [phone]  Go to   If symptoms worsen      Disposition medications (if applicable):  Discharge Medication List as of 6/21/2019 10:05 PM          Comment: Please note this report has been produced using speech recognition software and may contain errors related to that system including errors in grammar, punctuation, and spelling, as well as words and phrases that may be inappropriate. If there are any questions or concerns please feel free to contact the dictating provider for clarification.              Lovette Snellen, PA-C  06/22/19 9900

## 2019-11-05 ENCOUNTER — TELEPHONE (OUTPATIENT)
Dept: CARDIOLOGY CLINIC | Age: 84
End: 2019-11-05

## 2019-11-05 ENCOUNTER — OFFICE VISIT (OUTPATIENT)
Dept: CARDIOLOGY CLINIC | Age: 84
End: 2019-11-05
Payer: MEDICARE

## 2019-11-05 VITALS
DIASTOLIC BLOOD PRESSURE: 84 MMHG | BODY MASS INDEX: 31.11 KG/M2 | HEART RATE: 76 BPM | SYSTOLIC BLOOD PRESSURE: 132 MMHG | WEIGHT: 154.3 LBS | HEIGHT: 59 IN

## 2019-11-05 DIAGNOSIS — Z86.79 HISTORY OF CHF (CONGESTIVE HEART FAILURE): Primary | ICD-10-CM

## 2019-11-05 PROCEDURE — G8484 FLU IMMUNIZE NO ADMIN: HCPCS | Performed by: INTERNAL MEDICINE

## 2019-11-05 PROCEDURE — G8598 ASA/ANTIPLAT THER USED: HCPCS | Performed by: INTERNAL MEDICINE

## 2019-11-05 PROCEDURE — 4040F PNEUMOC VAC/ADMIN/RCVD: CPT | Performed by: INTERNAL MEDICINE

## 2019-11-05 PROCEDURE — G8417 CALC BMI ABV UP PARAM F/U: HCPCS | Performed by: INTERNAL MEDICINE

## 2019-11-05 PROCEDURE — 1123F ACP DISCUSS/DSCN MKR DOCD: CPT | Performed by: INTERNAL MEDICINE

## 2019-11-05 PROCEDURE — 1090F PRES/ABSN URINE INCON ASSESS: CPT | Performed by: INTERNAL MEDICINE

## 2019-11-05 PROCEDURE — 93000 ELECTROCARDIOGRAM COMPLETE: CPT | Performed by: INTERNAL MEDICINE

## 2019-11-05 PROCEDURE — 99214 OFFICE O/P EST MOD 30 MIN: CPT | Performed by: INTERNAL MEDICINE

## 2019-11-05 PROCEDURE — 1036F TOBACCO NON-USER: CPT | Performed by: INTERNAL MEDICINE

## 2019-11-05 PROCEDURE — G8427 DOCREV CUR MEDS BY ELIG CLIN: HCPCS | Performed by: INTERNAL MEDICINE

## 2019-11-05 RX ORDER — CARVEDILOL 3.12 MG/1
1 TABLET ORAL DAILY
COMMUNITY
Start: 2019-10-07 | End: 2020-08-14

## 2019-11-05 RX ORDER — LISINOPRIL 2.5 MG/1
1 TABLET ORAL DAILY
Status: ON HOLD | COMMUNITY
Start: 2019-10-11 | End: 2021-03-15 | Stop reason: HOSPADM

## 2020-02-24 ENCOUNTER — HOSPITAL ENCOUNTER (EMERGENCY)
Age: 85
Discharge: HOME | End: 2020-02-24
Payer: MEDICARE

## 2020-02-24 VITALS
DIASTOLIC BLOOD PRESSURE: 75 MMHG | TEMPERATURE: 97.8 F | RESPIRATION RATE: 18 BRPM | SYSTOLIC BLOOD PRESSURE: 140 MMHG | OXYGEN SATURATION: 95 % | HEART RATE: 87 BPM

## 2020-02-24 PROCEDURE — 99283 EMERGENCY DEPT VISIT LOW MDM: CPT

## 2020-02-24 RX ORDER — MINERAL OIL/PETROLATUM,WHITE 42.5-57.3%
1 OINTMENT (GRAM) OPHTHALMIC (EYE) NIGHTLY
Qty: 1 EACH | Refills: 0 | Status: SHIPPED | OUTPATIENT
Start: 2020-02-24 | End: 2020-04-24

## 2020-02-24 NOTE — ED PROVIDER NOTES
eMERGENCY dEPARTMENT eNCOUnter        [address] [facility]  Chief Complaint   Patient presents with    Epistaxis       HPI    Morena Padilla is a 80 y.o. female who presents with nosebleed since onset 1 hour PTA. The duration has been constant. The location is the left nare. The context was a spontaneous onset. The patient is taking aspirin. The quality of the bleeding is bright red blood. There no aggravating or alleviating factors except for direct pressure that has stopped the bleeding. The patient has no associated symptoms. Denies trauma. States was pulling clots out at nursing home and bleeding wouldn't stop until she arrived here. REVIEW OF SYSTEMS    Neurologic: No LOC  Cardiac: No Chest Pain, Denies syncope  Respiratory: No shortness of breath or or difficulty breathing  GI: No Bloody Stool or Diarrhea  : No Dysuria or Hematuria  General: No Fever    PAST MEDICAL & SURGICAL HISTORY    Past Medical History:   Diagnosis Date    Allergic rhinitis     Angina pectoris (HCC)     Aortic regurgitation     Mod to severe aortic regurg    Arthritis     CAD (coronary artery disease)     CHF (congestive heart failure) (HCC)     Chronic back pain     Dyslipidemia     GERD (gastroesophageal reflux disease)     H/O cardiovascular stress test 9/22/10    Cardiolite: Large Apical MI, associate mild EF 62 %    H/O cardiovascular stress test 10/29/2012    EF 68%, small in size and mild intensity perfusion defect in the apical wall    H/O CT scan of abdomen 9/13    Aortic atherosclerosis present w/subtle 2.0 cm infrarenal abd.aortic aneurysm.  H/O Doppler ultrasound 9/22/10    Carotid: estimated stenosis->50 % B/L    H/O Doppler ultrasound 10/13    Abd.U/S-interpretation=Cholelithiasis    H/O Doppler ultrasound 10/29/14    Arterial: No significant stenosis bilaterally.      H/O Doppler ultrasound 05/14/2018    venous     H/O echocardiogram 10/11, 9/10, 9/09    Echo: EF 65 %, normal LVSF, mod concentric LV hypertrophy, mod LVD dysfunction, Rt vent enlargement, Mild mitral regurg and mod aortic regurg,     H/O echocardiogram 10/29/12    EF 55%, normal lv systolic function, mild tricuspid regurg    H/O echocardiogram 08/13/15    EF 60% Mildly hypertrophied LV. Sclerotic aortic valve. Mild AR.  History of PTCA 2006    Stent to LAD.     [place] (hard of hearing)     Hx of cardiovascular stress test 8/13/2015    lexiscan-scar apical,no change from last study,EF70%    Hx of Doppler ultrasound 07/2013    carotid: WNL    Hyperlipidemia     Hypertension     Iron deficiency anemia     chronic-Per Candy maddie progress note- 8/22/2012    Left nephrolithiasis     MI, old     apical    Migraine     Mitral regurgitation 9/22/10    mild to mod MR, stress cardiolite: Large apical MI,  Associate mild  EF 62%    Muscle weakness (generalized)     chronic-Per Candy Maddie progress note 8/22/2012    Orthostatic hypotension     Osteoarthritis     Osteoporosis     Renal disease     Restless legs syndrome     S/P pericardiocentesis 2006     Past Surgical History:   Procedure Laterality Date    CATARACT REMOVAL      b/l    CORONARY ANGIOPLASTY WITH STENT PLACEMENT  2006    stent to LAD    HYSTERECTOMY      PERICARDIUM SURGERY  9/2006    pericardial window and pericardiocentesis       CURRENT MEDICATIONS    Current Outpatient Rx   Medication Sig Dispense Refill    carvedilol (COREG) 3.125 MG tablet 1 tablet daily       lisinopril (PRINIVIL;ZESTRIL) 2.5 MG tablet 1 tablet daily      aluminum & magnesium hydroxide-simethicone (MYLANTA) 400-400-40 MG/5ML SUSP Take 30 mLs by mouth every 6 hours as needed      vitamin D (CHOLECALCIFEROL) 35328 UNIT CAPS Take 50,000 Units by mouth every 30 days      docusate sodium (COLACE) 100 MG capsule Take 100 mg by mouth Daily      ferrous sulfate 325 (65 Fe) MG tablet Take 325 mg by mouth daily (with breakfast)      furosemide (LASIX) 20 MG tablet Take 20 mg by mouth daily      Multiple Vitamins-Minerals (ICAPS PO) Take 1 each by mouth 2 times daily      LACTOBACILLUS PO Take 1 each by mouth 2 times daily      atorvastatin (LIPITOR) 40 MG tablet Take 40 mg by mouth nightly      nystatin (MYCOSTATIN) 845675 UNIT/GM powder Apply topically every 12 hours as needed (yeast)      risedronate (ACTONEL) 35 MG tablet Take 35 mg by mouth every 7 days      polyethyl glycol-propyl glycol 0.4-0.3 % (SYSTANE) 0.4-0.3 % ophthalmic solution Place 2 drops into both eyes as needed for Dry Eyes      lidocaine (LIDODERM) 5 % Place 1 patch onto the skin daily 12 hours on, 12 hours off. 30 patch 0    fluticasone (FLONASE) 50 MCG/ACT nasal spray INHALE 1 SPRAY INTO EACH NOSTRIL DAILY 48 g 1    pantoprazole (PROTONIX) 40 MG tablet Take 1 tablet by mouth daily 90 tablet 1    loratadine (CLARITIN) 10 MG tablet Take 1 tablet by mouth daily 90 tablet 1    acetaminophen (TYLENOL ARTHRITIS PAIN) 650 MG CR tablet Take 650 mg by mouth every 6 hours as needed for Pain       calcium carbonate 600 MG TABS tablet Take 1 tablet by mouth 2 times daily       aspirin 81 MG chewable tablet Take 81 mg by mouth daily       vitamin B-12 (CYANOCOBALAMIN) 500 MCG tablet Take 500 mcg by mouth daily          ALLERGIES    Allergies   Allergen Reactions    Ibuprofen Itching    Antivert [Meclizine Hcl]     Bactrim [Sulfamethoxazole-Trimethoprim]     Codeine     Erythromycin     Macrobid [Nitrofurantoin Macrocrystal]     Niacin And Related     Seldane [Terfenadine]     Sulfa Antibiotics     Tramadol Other (See Comments)    Ultram [Tramadol Hcl]     Aspirin Other (See Comments)     Pt cannot have ASA in high doses.         SOCIAL & FAMILY HISTORY    Social History     Socioeconomic History    Marital status: Single     Spouse name: Not on file    Number of children: Not on file    Years of education: Not on file    Highest education level: Not on file   Occupational History    Not on file observed in the ED after the packing was placed. During the observation, the patient had no further bleeding. Vitals:    02/24/20 1542   BP: (!) 154/78   Pulse: 78   Resp: 17   Temp: 97.8 °F (36.6 °C)   TempSrc: Oral   SpO2: 94%     Patient's bleeding has stopped. Discussed importance of continuing her aspirin. Discussed importance of holding pressure if it begins to bleed again. Provided nasal clamp. Wrote prescription for humidifier for home, nasal mucosa does seem quite dry and this is likely source, she states they have turned the heat up and it is dried the air out of her apartment at nursing home. Plan is to discharge. Assessment and plan discussed with patient understands and agrees. Recommended returning to ED for any new or worsening symptoms. I have independently evaluated this patient. Differential Diagnosis: Coagulopathy, Platelet Dysfunction, Thrombocytopenia, Anemia, Nasal Infection, Aspiration Pneumonia, other. FINAL IMPRESSION    Epistaxis        (Please note that this note was completed with a voice recognition program.  Every attempt was made to edit the dictations, but inevitably there remain words that are mis-transcribed. Omar Valdez PA-C  02/24/20 [id number]       [facility]  02/29/20 [id number]

## 2020-02-27 ENCOUNTER — HOSPITAL ENCOUNTER (EMERGENCY)
Age: 85
Discharge: HOME | End: 2020-02-27
Payer: MEDICARE

## 2020-02-27 VITALS
WEIGHT: 154 LBS | HEART RATE: 57 BPM | HEIGHT: 59 IN | BODY MASS INDEX: 31.04 KG/M2 | OXYGEN SATURATION: 96 % | DIASTOLIC BLOOD PRESSURE: 85 MMHG | SYSTOLIC BLOOD PRESSURE: 181 MMHG | TEMPERATURE: 98.7 F | RESPIRATION RATE: 18 BRPM

## 2020-02-27 PROCEDURE — 6370000000 HC RX 637 (ALT 250 FOR IP): Performed by: PHYSICIAN ASSISTANT

## 2020-02-27 PROCEDURE — 2500000003 HC RX 250 WO HCPCS: Performed by: PHYSICIAN ASSISTANT

## 2020-02-27 PROCEDURE — 4500000029 HC MAJOR PROCEDURE

## 2020-02-27 PROCEDURE — 99283 EMERGENCY DEPT VISIT LOW MDM: CPT

## 2020-02-27 RX ORDER — TRANEXAMIC ACID 100 MG/ML
15 INJECTION, SOLUTION INTRAVENOUS ONCE
Status: COMPLETED | OUTPATIENT
Start: 2020-02-27 | End: 2020-02-27

## 2020-02-27 RX ADMIN — TRANEXAMIC ACID 1049 MG: 1 INJECTION, SOLUTION INTRAVENOUS at 06:28

## 2020-02-27 RX ADMIN — COCAINE HYDROCHLORIDE: 40 SOLUTION TOPICAL at 06:00

## 2020-02-27 NOTE — ED NOTES
Bed: ED-31  Expected date:   Expected time:   Means of arrival:   Comments:  Med trans  Dina collado   epistaxis on thinners, on and off since Sunday.       Summer Storm RN  02/27/20 [id number]

## 2020-02-27 NOTE — ED NOTES
[facility] here for transport. All paperwork sent with Pt. Spoke with Gaetano Encarnacion LPN at [facility] regarding discharge instructions.       Tita Aguirre RN  02/27/20 [id number]

## 2020-05-07 ENCOUNTER — TELEMEDICINE (OUTPATIENT)
Dept: CARDIOLOGY CLINIC | Age: 85
End: 2020-05-07
Payer: MEDICARE

## 2020-05-07 VITALS
WEIGHT: 150.4 LBS | HEART RATE: 76 BPM | SYSTOLIC BLOOD PRESSURE: 144 MMHG | BODY MASS INDEX: 27.68 KG/M2 | HEIGHT: 62 IN | DIASTOLIC BLOOD PRESSURE: 72 MMHG

## 2020-05-07 PROCEDURE — 99213 OFFICE O/P EST LOW 20 MIN: CPT | Performed by: INTERNAL MEDICINE

## 2020-05-07 RX ORDER — NYSTATIN 100000 U/G
CREAM TOPICAL DAILY
Status: ON HOLD | COMMUNITY
Start: 2020-05-06 | End: 2021-03-15 | Stop reason: HOSPADM

## 2020-05-07 RX ORDER — HYDROCORTISONE ACETATE 25 MG/1
25 SUPPOSITORY RECTAL 2 TIMES DAILY PRN
COMMUNITY

## 2020-05-07 RX ORDER — IPRATROPIUM BROMIDE AND ALBUTEROL SULFATE 2.5; .5 MG/3ML; MG/3ML
SOLUTION RESPIRATORY (INHALATION) PRN
COMMUNITY
Start: 2020-02-13

## 2020-05-07 RX ORDER — APIXABAN 5 MG/1
1 TABLET, FILM COATED ORAL 2 TIMES DAILY
COMMUNITY
Start: 2020-04-22

## 2020-05-07 NOTE — PROGRESS NOTES
CARDIOLOGY NOTE      5/7/2020    RE: Carlos Lovelace  (3/7/1930)                               TO:  Dr. Prince Moses is a 80 y.o. female who was seen today for management of  cad                                   HPI:   Patient is here for    - Coronary artery disease, does not have chest pain. Patient is  compliant with prescribed medicines. - Hyperlipidimea, lipids are in acceptable range.  Pt  is  compliant with medicines  - Hypertension,is  well controlled, pt is  compliant with medicines  - VHD  stable                The patient does not have cardiac complaints    Carlos Lovelace has the following history recorded in care path:  Patient Active Problem List    Diagnosis Date Noted    Swelling of extremity 05/04/2018    Low back pain without sciatica 04/27/2016    Claudication ([facility] [address] 04/17/2014    MI, old     CHF [facility]congestive heart failure) ([facility] [address]     Headache 07/16/2013    Hypertension     CAD (coronary artery disease)     Orthostatic hypotension     Angina pectoris (HCC)     Aortic regurgitation     S/P pericardiocentesis      Current Outpatient Medications   Medication Sig Dispense Refill    Multiple Vitamins-Minerals (PRESERVISION AREDS 2 PO) Take by mouth daily      ELIQUIS 5 MG TABS tablet 1 tablet 2 times daily      hydrocortisone (ANUSOL-HC) 25 MG suppository Place 25 mg rectally 2 times daily as needed for Hemorrhoids      carvedilol (COREG) 3.125 MG tablet 1 tablet daily       lisinopril (PRINIVIL;ZESTRIL) 2.5 MG tablet 1 tablet daily      aluminum & magnesium hydroxide-simethicone (MYLANTA) 400-400-40 MG/5ML SUSP Take 30 mLs by mouth every 6 hours as needed      vitamin D (CHOLECALCIFEROL) 53596 UNIT CAPS Take 50,000 Units by mouth every 30 days      docusate sodium (COLACE) 100 MG capsule Take 100 mg by mouth Daily      ferrous sulfate 325 (65 Fe) MG tablet Take 325 mg by mouth daily (with breakfast)      furosemide  H/O cardiovascular stress test 9/22/10    Cardiolite: Large Apical MI, associate mild EF 62 %    H/O cardiovascular stress test 10/29/2012    EF 68%, small in size and mild intensity perfusion defect in the apical wall    H/O CT scan of abdomen 9/13    Aortic atherosclerosis present w/subtle 2.0 cm infrarenal abd.aortic aneurysm.  H/O Doppler ultrasound 9/22/10    Carotid: estimated stenosis->50 % B/L    H/O Doppler ultrasound 10/13    Abd.U/S-interpretation=Cholelithiasis    H/O Doppler ultrasound 10/29/14    Arterial: No significant stenosis bilaterally.  H/O Doppler ultrasound 05/14/2018    venous     H/O echocardiogram 10/11, 9/10, 9/09    Echo: EF 65 %, normal LVSF, mod concentric LV hypertrophy, mod LVD dysfunction, Rt vent enlargement, Mild mitral regurg and mod aortic regurg,     H/O echocardiogram 10/29/12    EF 55%, normal lv systolic function, mild tricuspid regurg    H/O echocardiogram 08/13/15    EF 60% Mildly hypertrophied LV. Sclerotic aortic valve. Mild AR.  History of PTCA 2006    Stent to LAD.     [place] (hard of hearing)     Hx of cardiovascular stress test 8/13/2015    lexiscan-scar apical,no change from last study,EF70%    Hx of Doppler ultrasound 07/2013    carotid: WNL    Hyperlipidemia     Hypertension     Iron deficiency anemia     chronic-Per Candy maddie progress note- 8/22/2012    Left nephrolithiasis     MI, old     apical    Migraine     Mitral regurgitation 9/22/10    mild to mod MR, stress cardiolite: Large apical MI,  Associate mild  EF 62%    Muscle weakness (generalized)     [facility] 8/22/2012    Orthostatic hypotension     Osteoarthritis     Osteoporosis     Renal disease     Restless legs syndrome     S/P pericardiocentesis 2006     Past Surgical History:   Procedure Laterality Date    CATARACT REMOVAL      b/l    CORONARY ANGIOPLASTY WITH STENT PLACEMENT  2006    stent to LAD    HYSTERECTOMY      PERICARDIUM SURGERY  9/2006    pericardial window and pericardiocentesis      As reviewed History reviewed. No pertinent family history. Social History     Tobacco Use    Smoking status: Never Smoker    Smokeless tobacco: Never Used   Substance Use Topics    Alcohol use: No      Review of Systems:    Constitutional: Negative for diaphoresis and fatigue  Psychological:Negative for anxiety or depression  HENT: Negative for headaches, nasal congestion, sinus pain or vertigo  Eyes: Negative for visual disturbance. Endocrine: Negative for polydipsia/polyuria  Respiratory: Negative for shortness of breath  Cardiovascular: Negative for chest pain, dyspnea on exertion, claudication, edema, irregular heartbeat, murmur, palpitations or shortness of breath  Gastrointestinal: Negative for abdominal pain or heartburn  Genito-Urinary: Negative for urinary frequency/urgency  Musculoskeletal: Negative for muscle pain, muscular weakness, negative for pain in arm and leg or swelling in foot and leg  Neurological: Negative for dizziness, headaches, memory loss, numbness/tingling, visual changes, syncope  Dermatological: Negative for rash    Objective:  BP (!) 144/72   Pulse 76   Ht 5' 2\" (1.575 m)   Wt 150 lb 6.4 oz (68.2 kg)   BMI 27.51 kg/m²   Wt Readings from Last 3 Encounters:   05/07/20 150 lb 6.4 oz (68.2 kg)   02/27/20 154 lb (69.9 kg)   11/05/19 154 lb 4.8 oz (70 kg)     Body mass index is 27.51 kg/m². GENERAL - Alert, oriented, pleasant, in no apparent distress.   .    Lab Review   Lab Results   Component Value Date    TROPONINT <0.010 10/02/2014     BNP:  No results found for: BNP  PT/INR:    Lab Results   Component Value Date    INR 1.06 07/27/2017     No results found for: LABA1C  Lab Results   Component Value Date    WBC 8.4 06/21/2019    HCT 36.4 (L) 06/21/2019    MCV 92.6 06/21/2019     06/21/2019     Lab Results   Component Value Date    CHOL 136 07/27/2016    TRIG 236 (H) 07/27/2016    HDL 34 (L) 07/27/2016    LDLCALC 59 11/11/2014    LDLDIRECT 58 07/27/2016     Lab Results   Component Value Date    ALT 22 06/21/2019    AST 21 06/21/2019     BMP:    Lab Results   Component Value Date     06/21/2019    K 4.4 06/21/2019     06/21/2019    CO2 24 06/21/2019    BUN 21 06/21/2019    CREATININE 0.8 06/21/2019     CMP:   Lab Results   Component Value Date     06/21/2019    K 4.4 06/21/2019     06/21/2019    CO2 24 06/21/2019    BUN 21 06/21/2019    PROT 5.9 06/21/2019    PROT 6.4 08/14/2012     TSH:    Lab Results   Component Value Date    TSH 2.480 10/08/2015         Impression:    No diagnosis found. Patient Active Problem List   Diagnosis Code    Hypertension I10    CAD (coronary artery disease) I25.10    Orthostatic hypotension I95.1    Angina pectoris ([facility]) I20.9    Aortic regurgitation I35.1    S/P pericardiocentesis Z98.890    Headache R51    MI, old I25.2    CHF (congestive heart failure) ([facility]) I50.9    Claudication ([facility]) I73.9    Low back pain without sciatica M54.5    Swelling of extremity M79.89       Assessment & Plan:               -     CORONARY ARTERY DISEASE:  asymptomatic     All available  tests in chart reviewed. Management discussed . Testing ordered  no                                 -  Hypertension: Patients blood pressure is normal. Patient is advised about low sodium diet. Present medical regimen will not be changed. - VHD  stable              -  LIPID MANAGEMENT:  Available lipid  lab data reviewed  and patient was given dietary advice. NCEP- ATP III guidelines reviewed with patient. -   Changes  in medicines made: No                         - New DVT on eliquis       I confirm that this visit was completed in a telehealth setting ,using synchronous audiotechnology for real time patient interaction . The patient identity with name and date of birth was confirmed .  This evaluation of patient was done by telehealth in the setting of COVID-19

## 2020-08-14 ENCOUNTER — OFFICE VISIT (OUTPATIENT)
Dept: CARDIOLOGY CLINIC | Age: 85
End: 2020-08-14
Payer: MEDICARE

## 2020-08-14 VITALS
DIASTOLIC BLOOD PRESSURE: 74 MMHG | WEIGHT: 151.2 LBS | HEIGHT: 59 IN | SYSTOLIC BLOOD PRESSURE: 126 MMHG | BODY MASS INDEX: 30.48 KG/M2 | HEART RATE: 76 BPM | RESPIRATION RATE: 18 BRPM

## 2020-08-14 PROBLEM — I47.1 SVT (SUPRAVENTRICULAR TACHYCARDIA) (HCC): Status: ACTIVE | Noted: 2020-08-14

## 2020-08-14 PROBLEM — O22.30 DVT (DEEP VEIN THROMBOSIS) IN PREGNANCY: Status: ACTIVE | Noted: 2020-08-14

## 2020-08-14 PROBLEM — I47.10 SVT (SUPRAVENTRICULAR TACHYCARDIA): Status: ACTIVE | Noted: 2020-08-14

## 2020-08-14 PROCEDURE — G8417 CALC BMI ABV UP PARAM F/U: HCPCS | Performed by: NURSE PRACTITIONER

## 2020-08-14 PROCEDURE — 4040F PNEUMOC VAC/ADMIN/RCVD: CPT | Performed by: NURSE PRACTITIONER

## 2020-08-14 PROCEDURE — 1090F PRES/ABSN URINE INCON ASSESS: CPT | Performed by: NURSE PRACTITIONER

## 2020-08-14 PROCEDURE — 99213 OFFICE O/P EST LOW 20 MIN: CPT | Performed by: NURSE PRACTITIONER

## 2020-08-14 PROCEDURE — 1036F TOBACCO NON-USER: CPT | Performed by: NURSE PRACTITIONER

## 2020-08-14 PROCEDURE — 93000 ELECTROCARDIOGRAM COMPLETE: CPT | Performed by: INTERNAL MEDICINE

## 2020-08-14 PROCEDURE — G8428 CUR MEDS NOT DOCUMENT: HCPCS | Performed by: NURSE PRACTITIONER

## 2020-08-14 PROCEDURE — 1123F ACP DISCUSS/DSCN MKR DOCD: CPT | Performed by: NURSE PRACTITIONER

## 2020-08-14 RX ORDER — BACITRACIN, NEOMYCIN, POLYMYXIN B 400; 3.5; 5 [USP'U]/G; MG/G; [USP'U]/G
OINTMENT TOPICAL 2 TIMES DAILY
COMMUNITY

## 2020-08-14 RX ORDER — METOPROLOL TARTRATE 50 MG/1
50 TABLET, FILM COATED ORAL 2 TIMES DAILY
Qty: 60 TABLET | Refills: 2 | Status: SHIPPED | OUTPATIENT
Start: 2020-08-14

## 2020-08-14 ASSESSMENT — ENCOUNTER SYMPTOMS
BLOOD IN STOOL: 0
DIARRHEA: 0
ABDOMINAL DISTENTION: 0
EYE ITCHING: 0
SINUS PAIN: 0
ABDOMINAL PAIN: 0
COLOR CHANGE: 0
SHORTNESS OF BREATH: 0
CHEST TIGHTNESS: 0
SINUS PRESSURE: 0
CONSTIPATION: 0
VOMITING: 0
BACK PAIN: 0
EYE REDNESS: 0
NAUSEA: 0
SORE THROAT: 0

## 2020-08-14 NOTE — PROGRESS NOTES
Erick Conklin Oz  3/7/1930  [id number]    Have you had any Chest Pain - No    Have you had any Shortness of Breath - No      Have you had any dizziness - No    Have you had any palpitations - Yes  If Yes DO EKG - Do you feel your heart pounding  How long does it last - Unknown     Do you have any edema - swelling in Feet and ankles    If Yes - CHECK TO SEE IF THE EDEMA IS PITTING  How long have they been having edema - Years  If Yes - Have they worn compression stockings Yes    Check Venous \"LEG PROBLEM Questionnaire\"    Do you have a surgery or procedure scheduled in the near future - No    Ask patient [facility] if [facility] are not already [facility]    Check to see if we have an E-MAIL on file for the patient    Check medication list thoroughly!!!  BE SURE TO ASK PATIENT IF THEY NEED MEDICATION REFILLS

## 2020-08-14 NOTE — PROGRESS NOTES
CARDIOLOGY  NOTE      8/14/2020    RE: Andres Cisneros  (3/7/1930)                               TO:  Dr. Heydi Tam  The primary cardiologist is Dr Deb Lerner    CC:  Here here with complaints of palpitations and tachycardia    HPI: Thank you for involving me in taking care of your patient Andres Cisneros, who is a  80y.o. year old female with a history of CAD, HLD, HTN, and VHD. She is seen today for a a complaint of palpitations and tachycardia. She reports that she was sitting in her wheelchair when she had a sudden onset of tachycardia with associated palpitations. She reports this is the first time this is happened. Newly started on Eliquis for new DVT. She is a resident of a nursing home. The nursing home physician stopped the patient's Coreg and started her on metoprolol 25 mg twice daily. She has not had any further episodes of palpitations with tachycardia since starting the metoprolol   She during this visit denies chest pain, palpitations, shortness of breath, edema, dizziness or syncope. Vitals:    08/14/20 0828   BP: 126/74   Pulse: 76   Resp: 18       Current Outpatient Medications   Medication Sig Dispense Refill    NONFORMULARY Place 1 patch onto the skin 2 times daily      neomycin-bacitracin-polymyxin (NEOSPORIN) 400-5-5000 ointment Apply topically 2 times daily Apply topically 2 times daily.       guaiFENesin (ROBITUSSIN MUCUS+CHEST CONGEST) 100 MG/5ML liquid Take 200 mg by mouth 4 times daily as needed for Cough      metoprolol tartrate (LOPRESSOR) 50 MG tablet Take 1 tablet by mouth 2 times daily 60 tablet 2    Multiple Vitamins-Minerals (PRESERVISION AREDS 2 PO) Take by mouth daily      ELIQUIS 5 MG TABS tablet 1 tablet 2 times daily      hydrocortisone (ANUSOL-HC) 25 MG suppository Place 25 mg rectally 2 times daily as needed for Hemorrhoids      nystatin (MYCOSTATIN) 658607 UNIT/GM cream daily      ipratropium-albuterol (DUONEB) 0.5-2.5 (3) MG/3ML SOLN nebulizer solution as needed      lisinopril (PRINIVIL;ZESTRIL) 2.5 MG tablet 1 tablet daily      aluminum & magnesium hydroxide-simethicone (MYLANTA) 400-400-40 MG/5ML SUSP Take 30 mLs by mouth every 6 hours as needed      vitamin D (CHOLECALCIFEROL) 90886 UNIT CAPS Take 50,000 Units by mouth every 30 days      ferrous sulfate 325 (65 Fe) MG tablet Take 325 mg by mouth daily (with breakfast)      furosemide (LASIX) 20 MG tablet Take 20 mg by mouth daily      atorvastatin (LIPITOR) 40 MG tablet Take 40 mg by mouth nightly      nystatin (MYCOSTATIN) 878951 UNIT/GM powder Apply topically every 12 hours as needed (yeast)      risedronate (ACTONEL) 35 MG tablet Take 35 mg by mouth every 7 days      polyethyl glycol-propyl glycol 0.4-0.3 % (SYSTANE) 0.4-0.3 % ophthalmic solution Place 2 drops into both eyes as needed for Dry Eyes      lidocaine (LIDODERM) 5 % Place 1 patch onto the skin daily 12 hours on, 12 hours off. 30 patch 0    pantoprazole (PROTONIX) 40 MG tablet Take 1 tablet by mouth daily 90 tablet 1    loratadine (CLARITIN) 10 MG tablet Take 1 tablet by mouth daily 90 tablet 1    acetaminophen (TYLENOL ARTHRITIS PAIN) 650 MG CR tablet Take 650 mg by mouth every 6 hours as needed for Pain       calcium carbonate 600 MG TABS tablet Take 1 tablet by mouth 2 times daily       aspirin 81 MG chewable tablet Take 81 mg by mouth once a week       vitamin B-12 (CYANOCOBALAMIN) 500 MCG tablet Take 500 mcg by mouth daily       Humidifiers (COOL MIST HUMIDIFIER 0.8 GAL) MISC 1 Dose by Does not apply route nightly 1 each 0    docusate sodium (COLACE) 100 MG capsule Take 100 mg by mouth Daily       No current facility-administered medications for this visit. Allergies: Ibuprofen; Antivert [meclizine hcl]; Bactrim [sulfamethoxazole-trimethoprim]; Codeine; Erythromycin; Macrobid [nitrofurantoin macrocrystal]; Meclizine;  Niacin and related; Seldane [terfenadine]; Sulfa antibiotics; Tramadol; Ultram [tramadol hcl]; and Aspirin  Past Medical History:   Diagnosis Date    Allergic rhinitis     Angina pectoris (HCC)     Aortic regurgitation     Mod to severe aortic regurg    Arthritis     CAD (coronary artery disease)     CHF (congestive heart failure) (HCC)     Chronic back pain     Dyslipidemia     GERD (gastroesophageal reflux disease)     H/O cardiovascular stress test 9/22/10    Cardiolite: Large Apical MI, associate mild EF 62 %    H/O cardiovascular stress test 10/29/2012    EF 68%, small in size and mild intensity perfusion defect in the apical wall    H/O CT scan of abdomen 9/13    Aortic atherosclerosis present w/subtle 2.0 cm infrarenal abd.aortic aneurysm.  H/O Doppler ultrasound 9/22/10    Carotid: estimated stenosis->50 % B/L    H/O Doppler ultrasound 10/13    Abd.U/S-interpretation=Cholelithiasis    H/O Doppler ultrasound 10/29/14    Arterial: No significant stenosis bilaterally.  H/O Doppler ultrasound 05/14/2018    venous     H/O echocardiogram 10/11, 9/10, 9/09    Echo: EF 65 %, normal LVSF, mod concentric LV hypertrophy, mod LVD dysfunction, Rt vent enlargement, Mild mitral regurg and mod aortic regurg,     H/O echocardiogram 10/29/12    EF 55%, normal lv systolic function, mild tricuspid regurg    H/O echocardiogram 08/13/15    EF 60% Mildly hypertrophied LV. Sclerotic aortic valve. Mild AR.  History of PTCA 2006    Stent to LAD.     [place] (hard of hearing)     Hx of cardiovascular stress test 8/13/2015    lexiscan-scar apical,no change from last study,EF70%    Hx of Doppler ultrasound 07/2013    carotid: WNL    Hyperlipidemia     Hypertension     Iron deficiency anemia     chronic-Per [facility] progress note- 8/22/2012    Left nephrolithiasis     MI, old     apical    Migraine     Mitral regurgitation 9/22/10    mild to mod MR, stress cardiolite: Large apical MI,  Associate mild  EF 62%    Muscle weakness (generalized)     chronic-Per [facility] progress note 8/22/2012    Orthostatic hypotension     Osteoarthritis     Osteoporosis     Renal disease     Restless legs syndrome     S/P pericardiocentesis 2006     Past Surgical History:   Procedure Laterality Date    CATARACT REMOVAL      b/l    CORONARY ANGIOPLASTY WITH STENT PLACEMENT  2006    stent to LAD    HYSTERECTOMY      PERICARDIUM SURGERY  9/2006    pericardial window and pericardiocentesis     No family history on file. Social History     Tobacco Use    Smoking status: Never Smoker    Smokeless tobacco: Never Used   Substance Use Topics    Alcohol use: No        Review of Systems   Constitutional: Negative for activity change, appetite change and fatigue. HENT: Negative for congestion, sinus pressure, sinus pain and sore throat. Eyes: Negative for redness and itching. Respiratory: Negative for chest tightness and shortness of breath. Cardiovascular: Positive for palpitations. Negative for chest pain and leg swelling. Gastrointestinal: Negative for abdominal distention, abdominal pain, blood in stool, constipation, diarrhea, nausea and vomiting. Genitourinary: Negative for difficulty urinating and dysuria. Musculoskeletal: Positive for arthralgias and gait problem. Negative for back pain. Skin: Negative for color change, pallor, rash and wound. Neurological: Negative for dizziness, syncope and light-headedness. Psychiatric/Behavioral: Negative for agitation and confusion. The patient is not nervous/anxious. Objective:      Physical Exam:  /74 (Site: Left Upper Arm, Position: Sitting, Cuff Size: Medium Adult)   Pulse 76   Resp 18   Ht 4' 11\" (1.499 m)   Wt 151 lb 3.2 oz (68.6 kg)   BMI 30.54 kg/m²   Wt Readings from Last 3 Encounters:   08/14/20 151 lb 3.2 oz (68.6 kg)   05/07/20 150 lb 6.4 oz (68.2 kg)   02/27/20 154 lb (69.9 kg)     Body mass index is 30.54 kg/m².     Physical Exam  Constitutional:       Appearance: Normal appearance. She is normal weight. She is not ill-appearing or diaphoretic. HENT:      Head: Normocephalic and atraumatic. Right Ear: External ear normal.      Left Ear: External ear normal.      Nose: No congestion or rhinorrhea. Mouth/Throat:      Mouth: Mucous membranes are moist.      Pharynx: Oropharynx is clear. No oropharyngeal exudate or posterior oropharyngeal erythema. Eyes:      General:         Right eye: No discharge. Left eye: No discharge. Conjunctiva/sclera: Conjunctivae normal.      Pupils: Pupils are equal, round, and reactive to light. Neck:      Musculoskeletal: Neck supple. No muscular tenderness. Vascular: No carotid bruit. Cardiovascular:      Rate and Rhythm: Normal rate and regular rhythm. Pulses: Normal pulses. Heart sounds: Normal heart sounds. No murmur. No friction rub. No gallop. Pulmonary:      Effort: Pulmonary effort is normal.      Breath sounds: No stridor. No wheezing, rhonchi or rales. Abdominal:      General: Abdomen is flat. Bowel sounds are normal.      Palpations: Abdomen is soft. Musculoskeletal:      Right lower leg: No edema. Left lower leg: No edema. Skin:     General: Skin is warm and dry. Neurological:      Mental Status: She is alert and oriented to person, place, and time. Psychiatric:         Mood and Affect: Mood normal.         Behavior: Behavior normal.         Thought Content:  Thought content normal.         Judgment: Judgment normal.         DATA:  Lab Results   Component Value Date    TROPONINI 0.028 09/28/2013     BNP:  No results found for: BNP  PT/INR:  No results found for: PTINR  No results found for: LABA1C  Lab Results   Component Value Date    CHOL 136 07/27/2016    TRIG 236 (H) 07/27/2016    HDL 34 (L) 07/27/2016    LDLCALC 59 11/11/2014    LDLDIRECT 58 07/27/2016     Lab Results   Component Value Date    ALT 22 06/21/2019    AST 21 06/21/2019

## 2020-11-19 ENCOUNTER — OFFICE VISIT (OUTPATIENT)
Dept: CARDIOLOGY CLINIC | Age: 85
End: 2020-11-19
Payer: MEDICARE

## 2020-11-19 VITALS
DIASTOLIC BLOOD PRESSURE: 70 MMHG | WEIGHT: 151 LBS | SYSTOLIC BLOOD PRESSURE: 126 MMHG | HEART RATE: 62 BPM | BODY MASS INDEX: 30.44 KG/M2 | HEIGHT: 59 IN

## 2020-11-19 PROCEDURE — 1090F PRES/ABSN URINE INCON ASSESS: CPT | Performed by: INTERNAL MEDICINE

## 2020-11-19 PROCEDURE — 1036F TOBACCO NON-USER: CPT | Performed by: INTERNAL MEDICINE

## 2020-11-19 PROCEDURE — G8417 CALC BMI ABV UP PARAM F/U: HCPCS | Performed by: INTERNAL MEDICINE

## 2020-11-19 PROCEDURE — G8427 DOCREV CUR MEDS BY ELIG CLIN: HCPCS | Performed by: INTERNAL MEDICINE

## 2020-11-19 PROCEDURE — 1123F ACP DISCUSS/DSCN MKR DOCD: CPT | Performed by: INTERNAL MEDICINE

## 2020-11-19 PROCEDURE — 99214 OFFICE O/P EST MOD 30 MIN: CPT | Performed by: INTERNAL MEDICINE

## 2020-11-19 PROCEDURE — G8484 FLU IMMUNIZE NO ADMIN: HCPCS | Performed by: INTERNAL MEDICINE

## 2020-11-19 PROCEDURE — 4040F PNEUMOC VAC/ADMIN/RCVD: CPT | Performed by: INTERNAL MEDICINE

## 2020-11-19 NOTE — PROGRESS NOTES
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 Patient is  compliant with prescribed medicines. - Hypertension,is  well controlled, pt is  compliant with medicines  - Hyperlipidimea, importance of hyperlipidimea discussed with pt.   - VHD mild SOB    Cisco Castro has the following history recorded in care path:  Patient Active Problem List    Diagnosis Date Noted    SVT (supraventricular tachycardia) ([facility] 08/14/2020    DVT (deep vein thrombosis) in pregnancy 08/14/2020    Swelling of extremity 05/04/2018    Low back pain without sciatica 04/27/2016    Claudication ([facility] [facility] 04/17/2014    MI, old     CHF (congestive heart failure) ([facility]     Headache 07/16/2013    Hypertension     CAD (coronary artery disease)     Orthostatic hypotension     Angina pectoris (HCC)     Aortic regurgitation     S/P pericardiocentesis      Current Outpatient Medications   Medication Sig Dispense Refill    NONFORMULARY Place 1 patch onto the skin 2 times daily      neomycin-bacitracin-polymyxin (NEOSPORIN) 400-5-5000 ointment Apply topically 2 times daily Apply topically 2 times daily.       guaiFENesin (ROBITUSSIN MUCUS+CHEST CONGEST) 100 MG/5ML liquid Take 200 mg by mouth 4 times daily as needed for Cough      metoprolol tartrate (LOPRESSOR) 50 MG tablet Take 1 tablet by mouth 2 times daily 60 tablet 2    Multiple Vitamins-Minerals (PRESERVISION AREDS 2 PO) Take by mouth daily      ELIQUIS 5 MG TABS tablet 1 tablet 2 times daily      hydrocortisone (ANUSOL-HC) 25 MG suppository Place 25 mg rectally 2 times daily as needed for Hemorrhoids      nystatin (MYCOSTATIN) 570653 UNIT/GM cream daily      ipratropium-albuterol (DUONEB) 0.5-2.5 (3) MG/3ML SOLN nebulizer solution as needed      lisinopril (PRINIVIL;ZESTRIL) 2.5 MG tablet 1 tablet daily      aluminum & magnesium hydroxide-simethicone (MYLANTA) 400-400-40 MG/5ML SUSP Take 30 mLs by mouth every 6 hours as needed      vitamin D (CHOLECALCIFEROL) 88187 UNIT CAPS Take 50,000 Units by mouth every 30 days      docusate sodium (COLACE) 100 MG capsule Take 100 mg by mouth Daily      ferrous sulfate 325 (65 Fe) MG tablet Take 325 mg by mouth daily (with breakfast)      furosemide (LASIX) 20 MG tablet Take 20 mg by mouth daily      atorvastatin (LIPITOR) 40 MG tablet Take 40 mg by mouth nightly      nystatin (MYCOSTATIN) 691463 UNIT/GM powder Apply topically every 12 hours as needed (yeast)      risedronate (ACTONEL) 35 MG tablet Take 35 mg by mouth every 7 days      polyethyl glycol-propyl glycol 0.4-0.3 % (SYSTANE) 0.4-0.3 % ophthalmic solution Place 2 drops into both eyes as needed for Dry Eyes      lidocaine (LIDODERM) 5 % Place 1 patch onto the skin daily 12 hours on, 12 hours off. 30 patch 0    pantoprazole (PROTONIX) 40 MG tablet Take 1 tablet by mouth daily 90 tablet 1    loratadine (CLARITIN) 10 MG tablet Take 1 tablet by mouth daily 90 tablet 1    acetaminophen (TYLENOL ARTHRITIS PAIN) 650 MG CR tablet Take 650 mg by mouth every 6 hours as needed for Pain       calcium carbonate 600 MG TABS tablet Take 1 tablet by mouth 2 times daily       aspirin 81 MG chewable tablet Take 81 mg by mouth once a week       vitamin B-12 (CYANOCOBALAMIN) 500 MCG tablet Take 500 mcg by mouth daily       Humidifiers (COOL MIST HUMIDIFIER 0.8 GAL) MISC 1 Dose by Does not apply route nightly 1 each 0     No current facility-administered medications for this visit. Allergies: Ibuprofen; Antivert [meclizine hcl]; Bactrim [sulfamethoxazole-trimethoprim];  Codeine; Erythromycin; Macrobid [nitrofurantoin macrocrystal]; Meclizine; Niacin and related; Seldane [terfenadine]; Sulfa antibiotics; Tramadol; Ultram [tramadol hcl]; and Aspirin  Past Medical History:   Diagnosis Date    Allergic rhinitis     Angina pectoris (HCC)     Aortic regurgitation     Mod to severe aortic regurg    Arthritis     CAD (coronary artery disease)     CHF (congestive heart failure) (HCC)     Chronic back pain     Dyslipidemia     GERD (gastroesophageal reflux disease)     H/O cardiovascular stress test 9/22/10    Cardiolite: Large Apical MI, associate mild EF 62 %    H/O cardiovascular stress test 10/29/2012    EF 68%, small in size and mild intensity perfusion defect in the apical wall    H/O CT scan of abdomen 9/13    Aortic atherosclerosis present w/subtle 2.0 cm infrarenal abd.aortic aneurysm.  H/O Doppler ultrasound 9/22/10    Carotid: estimated stenosis->50 % B/L    H/O Doppler ultrasound 10/13    Abd.U/S-interpretation=Cholelithiasis    H/O Doppler ultrasound 10/29/14    Arterial: No significant stenosis bilaterally.  H/O Doppler ultrasound 05/14/2018    venous     H/O echocardiogram 10/11, 9/10, 9/09    Echo: EF 65 %, normal LVSF, mod concentric LV hypertrophy, mod LVD dysfunction, Rt vent enlargement, Mild mitral regurg and mod aortic regurg,     H/O echocardiogram 10/29/12    EF 55%, normal lv systolic function, mild tricuspid regurg    H/O echocardiogram 08/13/15    EF 60% Mildly hypertrophied LV. Sclerotic aortic valve. Mild AR.  History of PTCA 2006    Stent to LAD.     [place] (hard of hearing)     Hx of cardiovascular stress test 8/13/2015    lexiscan-scar apical,no change from last study,EF70%    Hx of Doppler ultrasound 07/2013    carotid: WNL    Hyperlipidemia     Hypertension     Iron deficiency anemia     chronic-Per [facility] progress note- 8/22/2012    Left nephrolithiasis     MI, old     apical    Migraine     Mitral regurgitation 9/22/10    mild to mod MR, stress cardiolite: 05/07/20 150 lb 6.4 oz (68.2 kg)     Body mass index is 30.5 kg/m². GENERAL - Alert, oriented, pleasant, in no apparent distress. EYES: No jaundice, no conjunctival pallor. SKIN: It is warm & dry. No rashes. No Echhymosis    HEENT - No clinically significant abnormalities seen. Neck - Supple. No jugular venous distention noted. No carotid bruits. Cardiovascular - Normal S1 and S2 without obvious murmur or gallop. Extremities - No cyanosis, clubbing, or significant edema. Pulmonary - No respiratory distress. No wheezes or rales. Abdomen - No masses, tenderness, or organomegaly. Musculoskeletal - No significant edema. No joint deformities. No muscle wasting. Neurologic - Cranial nerves II through XII are grossly intact. There were no gross focal neurologic abnormalities. Lab Review   Lab Results   Component Value Date    TROPONINT <0.010 10/02/2014     BNP:  No results found for: BNP  PT/INR:    Lab Results   Component Value Date    INR 1.06 07/27/2017     No results found for: LABA1C  Lab Results   Component Value Date    WBC 8.4 06/21/2019    HCT 36.4 (L) 06/21/2019    MCV 92.6 06/21/2019     06/21/2019     Lab Results   Component Value Date    CHOL 136 07/27/2016    TRIG 236 (H) 07/27/2016    HDL 34 (L) 07/27/2016    LDLCALC 59 11/11/2014    LDLDIRECT 58 07/27/2016     Lab Results   Component Value Date    ALT 22 06/21/2019    AST 21 06/21/2019     BMP:    Lab Results   Component Value Date     06/21/2019    K 4.4 06/21/2019     06/21/2019    CO2 24 06/21/2019    BUN 21 06/21/2019    CREATININE 0.8 06/21/2019     CMP:   Lab Results   Component Value Date     06/21/2019    K 4.4 06/21/2019     06/21/2019    CO2 24 06/21/2019    BUN 21 06/21/2019    PROT 5.9 06/21/2019    PROT 6.4 08/14/2012     TSH:    Lab Results   Component Value Date    TSH 2.480 10/08/2015       QUALITY MEASURES REVIEWED:      Impression:    No diagnosis found.    Patient Active Problem List

## 2020-11-19 NOTE — LETTER
Yessi Hernandez  3/7/1930  [id number]    Have you had any Chest Pain that is not new? - No    Have you had any Shortness of Breath - No    Have you had any dizziness - No    Have you had any palpitations that are not new? - No    Is the patient on any of the following medications -     Do you have any edema - swelling in legs    If Yes - CHECK TO SEE IF THE EDEMA IS PITTING  How long have they been having edema - Months  If Yes - Have they worn compression stockings Yes    Do you have a surgery or procedure scheduled in the near future - No      ? Ask patient if they want to sign up for [facility] [employer] if they are not already signed up    ? Check to see if we have an E-MAIL on file for the patient    ? Check medication list thoroughly!!! AND RECONCILE OUTSIDE MEDICATIONS  If dose has changed change the entire order not just the MG  BE SURE TO ASK PATIENT IF THEY NEED MEDICATION REFILLS    ?  At check out add to every patient's \"wrap up\" the following dot phrase AFTERHOURSEDUCATION and ensure we explain this to our patients

## 2021-03-11 ENCOUNTER — APPOINTMENT (OUTPATIENT)
Dept: GENERAL RADIOLOGY | Age: 86
DRG: 302 | End: 2021-03-11
Payer: MEDICARE

## 2021-03-11 ENCOUNTER — APPOINTMENT (OUTPATIENT)
Dept: CT IMAGING | Age: 86
DRG: 302 | End: 2021-03-11
Payer: MEDICARE

## 2021-03-11 ENCOUNTER — HOSPITAL ENCOUNTER (INPATIENT)
Age: 86
LOS: 1 days | Discharge: NURSING, REHAB OR LONG-TERM CARE | DRG: 302 | End: 2021-03-15
Attending: INTERNAL MEDICINE | Admitting: INTERNAL MEDICINE
Payer: MEDICARE

## 2021-03-11 DIAGNOSIS — R77.8 ELEVATED TROPONIN: Primary | ICD-10-CM
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
## 2021-03-11 PROBLEM — R79.89 ELEVATED TROPONIN: Status: ACTIVE | Noted: 2021-03-11

## 2021-03-11 LAB
ALBUMIN SERPL-MCNC: 3 GM/DL (ref 3.4–5)
ALP BLD-CCNC: 62 IU/L (ref 40–129)
ALT SERPL-CCNC: 10 U/L (ref 10–40)
ANION GAP SERPL CALCULATED.3IONS-SCNC: 10 MMOL/L (ref 4–16)
AST SERPL-CCNC: 15 IU/L (ref 15–37)
BASOPHILS ABSOLUTE: 0 K/CU MM
BASOPHILS RELATIVE PERCENT: 0.4 % (ref 0–1)
BILIRUB SERPL-MCNC: 0.4 MG/DL (ref 0–1)
BUN BLDV-MCNC: 15 MG/DL (ref 6–23)
CALCIUM SERPL-MCNC: 9.4 MG/DL (ref 8.3–10.6)
CHLORIDE BLD-SCNC: 105 MMOL/L (ref 99–110)
CO2: 22 MMOL/L (ref 21–32)
CREAT SERPL-MCNC: 0.7 MG/DL (ref 0.6–1.1)
DIFFERENTIAL TYPE: ABNORMAL
EOSINOPHILS ABSOLUTE: 0.5 K/CU MM
EOSINOPHILS RELATIVE PERCENT: 6.4 % (ref 0–3)
GFR AFRICAN AMERICAN: >60 ML/MIN/1.73M2
GFR NON-AFRICAN AMERICAN: >60 ML/MIN/1.73M2
GLUCOSE BLD-MCNC: 104 MG/DL (ref 70–99)
HCT VFR BLD CALC: 39.6 % (ref 37–47)
HEMOGLOBIN: 12.6 GM/DL (ref 12.5–16)
IMMATURE NEUTROPHIL %: 0.3 % (ref 0–0.43)
LYMPHOCYTES ABSOLUTE: 1.7 K/CU MM
LYMPHOCYTES RELATIVE PERCENT: 23.6 % (ref 24–44)
MCH RBC QN AUTO: 29.6 PG (ref 27–31)
MCHC RBC AUTO-ENTMCNC: 31.8 % (ref 32–36)
MCV RBC AUTO: 93.2 FL (ref 78–100)
MONOCYTES ABSOLUTE: 0.7 K/CU MM
MONOCYTES RELATIVE PERCENT: 9.8 % (ref 0–4)
NUCLEATED RBC %: 0 %
PDW BLD-RTO: 14.3 % (ref 11.7–14.9)
PLATELET # BLD: 177 K/CU MM (ref 140–440)
PMV BLD AUTO: 10.6 FL (ref 7.5–11.1)
POTASSIUM SERPL-SCNC: 3.7 MMOL/L (ref 3.5–5.1)
RBC # BLD: 4.25 M/CU MM (ref 4.2–5.4)
SEGMENTED NEUTROPHILS ABSOLUTE COUNT: 4.4 K/CU MM
SEGMENTED NEUTROPHILS RELATIVE PERCENT: 59.5 % (ref 36–66)
SODIUM BLD-SCNC: 137 MMOL/L (ref 135–145)
TOTAL IMMATURE NEUTOROPHIL: 0.02 K/CU MM
TOTAL NUCLEATED RBC: 0 K/CU MM
TOTAL PROTEIN: 5.2 GM/DL (ref 6.4–8.2)
TROPONIN T: 0.05 NG/ML
WBC # BLD: 7.4 K/CU MM (ref 4–10.5)

## 2021-03-11 PROCEDURE — 93005 ELECTROCARDIOGRAM TRACING: CPT | Performed by: PHYSICIAN ASSISTANT

## 2021-03-11 PROCEDURE — 6370000000 HC RX 637 (ALT 250 FOR IP): Performed by: INTERNAL MEDICINE

## 2021-03-11 PROCEDURE — 84484 ASSAY OF TROPONIN QUANT: CPT

## 2021-03-11 PROCEDURE — 71045 X-RAY EXAM CHEST 1 VIEW: CPT

## 2021-03-11 PROCEDURE — G0378 HOSPITAL OBSERVATION PER HR: HCPCS

## 2021-03-11 PROCEDURE — 85025 COMPLETE CBC W/AUTO DIFF WBC: CPT

## 2021-03-11 PROCEDURE — 2580000003 HC RX 258: Performed by: INTERNAL MEDICINE

## 2021-03-11 PROCEDURE — 80053 COMPREHEN METABOLIC PANEL: CPT

## 2021-03-11 PROCEDURE — 99285 EMERGENCY DEPT VISIT HI MDM: CPT

## 2021-03-11 PROCEDURE — 70450 CT HEAD/BRAIN W/O DYE: CPT

## 2021-03-11 PROCEDURE — 6370000000 HC RX 637 (ALT 250 FOR IP): Performed by: PHYSICIAN ASSISTANT

## 2021-03-11 PROCEDURE — 36415 COLL VENOUS BLD VENIPUNCTURE: CPT

## 2021-03-11 PROCEDURE — 72170 X-RAY EXAM OF PELVIS: CPT

## 2021-03-11 PROCEDURE — 73030 X-RAY EXAM OF SHOULDER: CPT

## 2021-03-11 PROCEDURE — 72125 CT NECK SPINE W/O DYE: CPT

## 2021-03-11 RX ORDER — HYDROCORTISONE ACETATE 25 MG/1
25 SUPPOSITORY RECTAL 2 TIMES DAILY PRN
Status: DISCONTINUED | OUTPATIENT
Start: 2021-03-11 | End: 2021-03-15 | Stop reason: HOSPADM

## 2021-03-11 RX ORDER — LISINOPRIL 2.5 MG/1
2.5 TABLET ORAL DAILY
Status: DISCONTINUED | OUTPATIENT
Start: 2021-03-12 | End: 2021-03-13

## 2021-03-11 RX ORDER — ATORVASTATIN CALCIUM 40 MG/1
40 TABLET, FILM COATED ORAL NIGHTLY
Status: DISCONTINUED | OUTPATIENT
Start: 2021-03-12 | End: 2021-03-15 | Stop reason: HOSPADM

## 2021-03-11 RX ORDER — POLYETHYLENE GLYCOL 3350 17 G/17G
17 POWDER, FOR SOLUTION ORAL DAILY PRN
Status: DISCONTINUED | OUTPATIENT
Start: 2021-03-11 | End: 2021-03-15 | Stop reason: HOSPADM

## 2021-03-11 RX ORDER — ONDANSETRON 2 MG/ML
4 INJECTION INTRAMUSCULAR; INTRAVENOUS EVERY 6 HOURS PRN
Status: DISCONTINUED | OUTPATIENT
Start: 2021-03-11 | End: 2021-03-15 | Stop reason: HOSPADM

## 2021-03-11 RX ORDER — METOPROLOL TARTRATE 50 MG/1
50 TABLET, FILM COATED ORAL 2 TIMES DAILY
Status: DISCONTINUED | OUTPATIENT
Start: 2021-03-12 | End: 2021-03-15 | Stop reason: HOSPADM

## 2021-03-11 RX ORDER — LANOLIN ALCOHOL/MO/W.PET/CERES
500 CREAM (GRAM) TOPICAL DAILY
Status: DISCONTINUED | OUTPATIENT
Start: 2021-03-12 | End: 2021-03-15 | Stop reason: HOSPADM

## 2021-03-11 RX ORDER — SODIUM CHLORIDE 0.9 % (FLUSH) 0.9 %
10 SYRINGE (ML) INJECTION PRN
Status: DISCONTINUED | OUTPATIENT
Start: 2021-03-11 | End: 2021-03-15 | Stop reason: HOSPADM

## 2021-03-11 RX ORDER — ACETAMINOPHEN 650 MG/1
650 SUPPOSITORY RECTAL EVERY 6 HOURS PRN
Status: DISCONTINUED | OUTPATIENT
Start: 2021-03-11 | End: 2021-03-15 | Stop reason: HOSPADM

## 2021-03-11 RX ORDER — SODIUM CHLORIDE 0.9 % (FLUSH) 0.9 %
10 SYRINGE (ML) INJECTION 2 TIMES DAILY
Status: DISCONTINUED | OUTPATIENT
Start: 2021-03-12 | End: 2021-03-15 | Stop reason: HOSPADM

## 2021-03-11 RX ORDER — MAGNESIUM HYDROXIDE/ALUMINUM HYDROXICE/SIMETHICONE 120; 1200; 1200 MG/30ML; MG/30ML; MG/30ML
30 SUSPENSION ORAL EVERY 6 HOURS PRN
Status: DISCONTINUED | OUTPATIENT
Start: 2021-03-11 | End: 2021-03-15 | Stop reason: HOSPADM

## 2021-03-11 RX ORDER — ERGOCALCIFEROL 1.25 MG/1
50000 CAPSULE ORAL
Status: DISCONTINUED | OUTPATIENT
Start: 2021-03-15 | End: 2021-03-15 | Stop reason: HOSPADM

## 2021-03-11 RX ORDER — IPRATROPIUM BROMIDE AND ALBUTEROL SULFATE 2.5; .5 MG/3ML; MG/3ML
1 SOLUTION RESPIRATORY (INHALATION) EVERY 4 HOURS PRN
Status: DISCONTINUED | OUTPATIENT
Start: 2021-03-11 | End: 2021-03-15 | Stop reason: HOSPADM

## 2021-03-11 RX ORDER — ACETAMINOPHEN 325 MG/1
650 TABLET ORAL EVERY 6 HOURS PRN
Status: DISCONTINUED | OUTPATIENT
Start: 2021-03-11 | End: 2021-03-15 | Stop reason: HOSPADM

## 2021-03-11 RX ORDER — ACETAMINOPHEN 500 MG
500 TABLET ORAL ONCE
Status: COMPLETED | OUTPATIENT
Start: 2021-03-11 | End: 2021-03-11

## 2021-03-11 RX ORDER — DOCUSATE SODIUM 100 MG/1
100 CAPSULE, LIQUID FILLED ORAL DAILY
Status: DISCONTINUED | OUTPATIENT
Start: 2021-03-12 | End: 2021-03-15 | Stop reason: HOSPADM

## 2021-03-11 RX ORDER — FERROUS SULFATE 325(65) MG
325 TABLET ORAL
Status: DISCONTINUED | OUTPATIENT
Start: 2021-03-12 | End: 2021-03-15 | Stop reason: HOSPADM

## 2021-03-11 RX ORDER — FUROSEMIDE 20 MG/1
20 TABLET ORAL DAILY
Status: DISCONTINUED | OUTPATIENT
Start: 2021-03-12 | End: 2021-03-15 | Stop reason: HOSPADM

## 2021-03-11 RX ORDER — PANTOPRAZOLE SODIUM 40 MG/1
40 TABLET, DELAYED RELEASE ORAL DAILY
Status: DISCONTINUED | OUTPATIENT
Start: 2021-03-12 | End: 2021-03-15 | Stop reason: HOSPADM

## 2021-03-11 RX ORDER — PROMETHAZINE HYDROCHLORIDE 25 MG/1
12.5 TABLET ORAL EVERY 6 HOURS PRN
Status: DISCONTINUED | OUTPATIENT
Start: 2021-03-11 | End: 2021-03-15 | Stop reason: HOSPADM

## 2021-03-11 RX ORDER — CALCIUM CARBONATE 500(1250)
500 TABLET ORAL 2 TIMES DAILY
Status: DISCONTINUED | OUTPATIENT
Start: 2021-03-12 | End: 2021-03-15 | Stop reason: HOSPADM

## 2021-03-11 RX ADMIN — Medication 500 MG: at 23:54

## 2021-03-11 RX ADMIN — ACETAMINOPHEN 500 MG: 500 TABLET ORAL at 18:42

## 2021-03-11 RX ADMIN — METOPROLOL TARTRATE 50 MG: 50 TABLET, FILM COATED ORAL at 23:54

## 2021-03-11 RX ADMIN — SODIUM CHLORIDE, PRESERVATIVE FREE 10 ML: 5 INJECTION INTRAVENOUS at 23:55

## 2021-03-11 RX ADMIN — ATORVASTATIN CALCIUM 40 MG: 40 TABLET, FILM COATED ORAL at 23:54

## 2021-03-11 ASSESSMENT — PAIN SCALES - GENERAL: PAINLEVEL_OUTOF10: 3

## 2021-03-11 ASSESSMENT — PAIN DESCRIPTION - PAIN TYPE: TYPE: CHRONIC PAIN

## 2021-03-11 ASSESSMENT — PAIN DESCRIPTION - FREQUENCY: FREQUENCY: CONTINUOUS

## 2021-03-11 ASSESSMENT — PAIN DESCRIPTION - LOCATION: LOCATION: BACK

## 2021-03-11 NOTE — ED PROVIDER NOTES
eMERGENCY dEPARTMENT eNCOUnter      PCP: Laxmi Boucher    Chief Complaint   Patient presents with   Lacey Sorensen       HPI    Faye Rudolph is a 80 y.o. female who presents from her skilled nursing facility following a fall. Patient states that she received a pill from her nurse for her evening medications and states this made her feel very drowsy. She was transferring from her bed when she fell. She denies hitting her head or loss of consciousness. She states she was helped back into bed and began having diffuse pain as well as pain into her right shoulder which prompted ED visit. She denies preceding chest pain, shortness of breath, palpitations, numbness, tingling or weakness of extremities. She is not currently ambulatory at the facility and requires assistance with transferring. Patient states that she is concerned that this was not part of her typical medications and she does not typically get drowsy after her evening medications. She is on Eliquis. REVIEW OF SYSTEMS    General: No fever  ENT:  No visual changes. No headache. Cardiac: No Chest Pain, denies syncope  Respiratory: No cough or difficulty breathing  GI: No vomiting. No Bloody Stool or Diarrhea  : No Dysuria or Hematuria  MSKTL:  See HPI. No neck or back pain. Neurologic: denies LOC, no headache, dizziness, confusion.   No hearing loss    See HPI and nursing notes for additional information     PAST MEDICAL & SURGICAL HISTORY    Past Medical History:   Diagnosis Date    Allergic rhinitis     Angina pectoris ([facility])     Aortic regurgitation     Mod to severe aortic regurg    Arthritis     CAD (coronary artery disease)     CHF (congestive heart failure) (HCC)     Chronic back pain     Dyslipidemia     GERD (gastroesophageal reflux disease)     H/O cardiovascular stress test 9/22/10    Cardiolite: Large Apical MI, associate mild EF 62 %    H/O cardiovascular stress test 10/29/2012    EF 68%, small in size and mild intensity perfusion defect in the apical wall    H/O CT scan of abdomen 9/13    Aortic atherosclerosis present w/subtle 2.0 cm infrarenal abd.aortic aneurysm.  H/O Doppler ultrasound 9/22/10    Carotid: estimated stenosis->50 % B/L    H/O Doppler ultrasound 10/13    Abd.U/S-interpretation=Cholelithiasis    H/O Doppler ultrasound 10/29/14    Arterial: No significant stenosis bilaterally.  H/O Doppler ultrasound 05/14/2018    venous     H/O echocardiogram 10/11, 9/10, 9/09    Echo: EF 65 %, normal LVSF, mod concentric LV hypertrophy, mod LVD dysfunction, Rt vent enlargement, Mild mitral regurg and mod aortic regurg,     H/O echocardiogram 10/29/12    EF 55%, normal lv systolic function, mild tricuspid regurg    H/O echocardiogram 08/13/15    EF 60% Mildly hypertrophied LV. Sclerotic aortic valve. Mild AR.  History of PTCA 2006    Stent to LAD.    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    Migraine     Mitral regurgitation 9/22/10    mild to mod MR, stress cardiolite: Large apical MI,  Associate mild  EF 62%    Muscle weakness (generalized)     chronic-Per Candy Maddie progress note 8/22/2012    Orthostatic hypotension     Osteoarthritis     Osteoporosis     Renal disease     Restless legs syndrome     S/P pericardiocentesis 2006     Past Surgical History:   Procedure Laterality Date    CATARACT REMOVAL      b/l    CORONARY ANGIOPLASTY WITH STENT PLACEMENT  2006    stent to LAD    HYSTERECTOMY      PERICARDIUM SURGERY  9/2006    pericardial window and pericardiocentesis       CURRENT MEDICATIONS    Current Outpatient Rx   Medication Sig Dispense Refill    mouth daily 90 tablet 1    loratadine (CLARITIN) 10 MG tablet Take 1 tablet by mouth daily 90 tablet 1    acetaminophen (TYLENOL ARTHRITIS PAIN) 650 MG CR tablet Take 650 mg by mouth every 6 hours as needed for Pain       calcium carbonate 600 MG TABS tablet Take 1 tablet by mouth 2 times daily       aspirin 81 MG chewable tablet Take 81 mg by mouth once a week       vitamin B-12 (CYANOCOBALAMIN) 500 MCG tablet Take 500 mcg by mouth daily          ALLERGIES    Allergies   Allergen Reactions    Ibuprofen Itching    Antivert [Meclizine Hcl]     Bactrim [Sulfamethoxazole-Trimethoprim]     Codeine     Erythromycin     Macrobid [Nitrofurantoin Macrocrystal]     Meclizine     Niacin And Related     Seldane [Terfenadine]     Sulfa Antibiotics     Tramadol Other (See Comments)    Ultram [Tramadol Hcl]     Aspirin Other (See Comments)     Pt cannot have ASA in high doses.         SOCIAL & FAMILY HISTORY    Social History     Socioeconomic History    Marital status: Single     Spouse name: Not on file    Number of children: Not on file    Years of education: Not on file    Highest education level: Not on file   Occupational History    Not on file   Social Needs    Financial resource strain: Not on file    Food insecurity     Worry: Not on file     Inability: Not on file    Transportation needs     Medical: Not on file     Non-medical: Not on file   Tobacco Use    Smoking status: Never Smoker    Smokeless tobacco: Never Used   Substance and Sexual Activity    Alcohol use: No    Drug use: No    Sexual activity: Not on file   Lifestyle    Physical activity     Days per week: Not on file     Minutes per session: Not on file    Stress: Not on file   Relationships    Social connections     Talks on phone: Not on file     Gets together: Not on file     Attends Caodaism service: Not on file     Active member of club or organization: Not on file     Attends meetings of clubs or organizations: Not on file     Relationship status: Not on file    Intimate partner violence     Fear of current or ex partner: Not on file     Emotionally abused: Not on file     Physically abused: Not on file     Forced sexual activity: Not on file   Other Topics Concern    Not on file   Social History Narrative    Not on file     No family history on file. PHYSICAL EXAM    VITAL SIGNS: BP (!) 162/59   Pulse 81   Temp 98.5 °F (36.9 °C) (Oral)   Resp 24   Ht 4' 11\" (1.499 m)   Wt 151 lb (68.5 kg)   SpO2 93%   BMI 30.50 kg/m²    Constitutional:  Well developed, well nourished, no acute distress   Eyes: EOMI. PERRL, sclera nonicteric. Anterior chambers clear. Funduscopic exam without any gross abnormality or hemorrhages. HENT:  Atraumatic, no trismus. Ears canals and TMs free of blood or clear fluid. Nasal passages and oropharynx free of blood or clear fluid. Neck/Lymphatics: supple, no JVD, no swollen nodes. no posterior neck tenderness  Respiratory:  Lungs Clear, no retractions   Cardiovascular:  regular rate, no murmurs  GI:  Soft, nontender, normal bowel sounds  Musculoskeletal:  No edema  No appreciable skin changes right upper extremity. Tenderness palpation of the right shoulder. She is good range of motion. Radial pulse 2+.  strength out of 5. No appreciable skin changes, tenderness or range of motion deficit of right elbow. Integument:  Well hydrated, no petechiae   Neurologic:    Alert & oriented , No focal deficits noted. Cranial nerves II through XII grossly intact. Normal gross motor coordination & motor strength bilateral upper and lower extremities. Sensation intact.   Psych: Pleasant affect, no hallucinations        LABS:  Results for orders placed or performed during the hospital encounter of 03/11/21   CBC auto diff   Result Value Ref Range    WBC 7.4 4.0 - 10.5 K/CU MM    RBC 4.25 4.2 - 5.4 M/CU MM    Hemoglobin 12.6 12.5 - 16.0 GM/DL    Hematocrit 39.6 37 - 47 %    MCV 93.2 78 - 100 FL    MCH 29.6 27 - 31 PG    MCHC 31.8 (L) 32.0 - 36.0 %    RDW 14.3 11.7 - 14.9 %    Platelets 463 189 - 112 K/CU MM    MPV 10.6 7.5 - 11.1 FL    Differential Type AUTOMATED DIFFERENTIAL     Segs Relative 59.5 36 - 66 %    Lymphocytes % 23.6 (L) 24 - 44 %    Monocytes % 9.8 (H) 0 - 4 %    Eosinophils % 6.4 (H) 0 - 3 %    Basophils % 0.4 0 - 1 %    Segs Absolute 4.4 K/CU MM    Lymphocytes Absolute 1.7 K/CU MM    Monocytes Absolute 0.7 K/CU MM    Eosinophils Absolute 0.5 K/CU MM    Basophils Absolute 0.0 K/CU MM    Nucleated RBC % 0.0 %    Total Nucleated RBC 0.0 K/CU MM    Total Immature Neutrophil 0.02 K/CU MM    Immature Neutrophil % 0.3 0 - 0.43 %   CMP   Result Value Ref Range    Sodium 137 135 - 145 MMOL/L    Potassium 3.7 3.5 - 5.1 MMOL/L    Chloride 105 99 - 110 mMol/L    CO2 22 21 - 32 MMOL/L    BUN 15 6 - 23 MG/DL    CREATININE 0.7 0.6 - 1.1 MG/DL    Glucose 104 (H) 70 - 99 MG/DL    Calcium 9.4 8.3 - 10.6 MG/DL    Albumin 3.0 (L) 3.4 - 5.0 GM/DL    Total Protein 5.2 (L) 6.4 - 8.2 GM/DL    Total Bilirubin 0.4 0.0 - 1.0 MG/DL    ALT 10 10 - 40 U/L    AST 15 15 - 37 IU/L    Alkaline Phosphatase 62 40 - 129 IU/L    GFR Non-African American >60 >60 mL/min/1.73m2    GFR African American >60 >60 mL/min/1.73m2    Anion Gap 10 4 - 16   Troponin   Result Value Ref Range    Troponin T 0.050 (H) <0.01 NG/ML   EKG 12 Lead   Result Value Ref Range    Ventricular Rate 78 BPM    Atrial Rate 78 BPM    P-R Interval 174 ms    QRS Duration 114 ms    Q-T Interval 416 ms    QTc Calculation (Bazett) 474 ms    P Axis 61 degrees    R Axis -22 degrees    T Axis 145 degrees    Diagnosis       Normal sinus rhythm  Left ventricular hypertrophy with repolarization abnormality  Cannot rule out Septal infarct , age undetermined  Abnormal ECG  No previous ECGs available           EKG Interpretation  Please see ED physician's note for EKG interpretation      RADIOLOGY       XR SHOULDER RIGHT (MIN 2 VIEWS)   Final Result   Mild AC joint osteoarthritis. No acute osseous abnormality. Follow-up imaging recommended if pain persists or worsens following   conservative management. XR PELVIS (1-2 VIEWS)   Final Result   No acute osseous abnormality on AP pelvis radiograph. If pain persists or worsens, then additional evaluation with MRI is indicated   to ensure no underlying radiographically occult process such as fracture, AVN   or transient osteoporosis. XR CHEST PORTABLE   Final Result   No definite acute pulmonary disease. Senescent pulmonary changes. Calcific atherosclerotic disease aorta. CT CERVICAL SPINE WO CONTRAST   Final Result   No acute abnormality of the cervical spine. CT HEAD WO CONTRAST   Final Result   No acute intracranial abnormality. ED COURSE & MEDICAL DECISION MAKING       Vital signs and nursing notes reviewed during ED course. I have independently evaluated this patient . Supervising MD present in the Emergency Department, available for consultation, throughout entirety of  patient care. Patient presents as above following a fall at her skilled nursing facility. She attributes this to feeling drowsy. She is moving all extremities on arrival to the ED and denies any chest pain, shortness of breath, palpitations. Lab work with elevated troponin at 0.050. EKG interpreted by supervising physician without acute changes from previous. Chest x-ray without acute cardiopulmonary process. Right shoulder without acute process. CT head and neck without acute process. With detectable troponin and feeling unwell before the fall, will plan evaluation for further cardiac evaluation. She has been compliant with Eliquis and aspirin, low suspicion for PE as etiology of her fall, she denies any preceding chest pain or shortness of breath just general drowsiness. Daughter is at bedside states is unusual for her.   She and patient are agreeable with admission for

## 2021-03-11 NOTE — ED TRIAGE NOTES
Patient to ED per EMS, patient is resident of [facility] assisted living. Patient states nurse gave her some kind of a pill that made her sleepy and she fell getting out of her wheelchair into bed.

## 2021-03-12 LAB
ANION GAP SERPL CALCULATED.3IONS-SCNC: 10 MMOL/L (ref 4–16)
BUN BLDV-MCNC: 11 MG/DL (ref 6–23)
CALCIUM SERPL-MCNC: 9.4 MG/DL (ref 8.3–10.6)
CHLORIDE BLD-SCNC: 105 MMOL/L (ref 99–110)
CO2: 23 MMOL/L (ref 21–32)
CREAT SERPL-MCNC: 0.7 MG/DL (ref 0.6–1.1)
GFR AFRICAN AMERICAN: >60 ML/MIN/1.73M2
GFR NON-AFRICAN AMERICAN: >60 ML/MIN/1.73M2
GLUCOSE BLD-MCNC: 119 MG/DL (ref 70–99)
HCT VFR BLD CALC: 41.5 % (ref 37–47)
HEMOGLOBIN: 13 GM/DL (ref 12.5–16)
LV EF: 50 %
LVEF MODALITY: NORMAL
MCH RBC QN AUTO: 28.9 PG (ref 27–31)
MCHC RBC AUTO-ENTMCNC: 31.3 % (ref 32–36)
MCV RBC AUTO: 92.2 FL (ref 78–100)
PDW BLD-RTO: 14.1 % (ref 11.7–14.9)
PLATELET # BLD: 198 K/CU MM (ref 140–440)
PMV BLD AUTO: 10.4 FL (ref 7.5–11.1)
POTASSIUM SERPL-SCNC: 3.8 MMOL/L (ref 3.5–5.1)
RBC # BLD: 4.5 M/CU MM (ref 4.2–5.4)
SODIUM BLD-SCNC: 138 MMOL/L (ref 135–145)
TROPONIN T: 0.04 NG/ML
TROPONIN T: 0.05 NG/ML
WBC # BLD: 7.6 K/CU MM (ref 4–10.5)

## 2021-03-12 PROCEDURE — 80048 BASIC METABOLIC PNL TOTAL CA: CPT

## 2021-03-12 PROCEDURE — 6370000000 HC RX 637 (ALT 250 FOR IP): Performed by: INTERNAL MEDICINE

## 2021-03-12 PROCEDURE — 93306 TTE W/DOPPLER COMPLETE: CPT

## 2021-03-12 PROCEDURE — 94761 N-INVAS EAR/PLS OXIMETRY MLT: CPT

## 2021-03-12 PROCEDURE — 2580000003 HC RX 258: Performed by: INTERNAL MEDICINE

## 2021-03-12 PROCEDURE — 97530 THERAPEUTIC ACTIVITIES: CPT

## 2021-03-12 PROCEDURE — 99222 1ST HOSP IP/OBS MODERATE 55: CPT | Performed by: INTERNAL MEDICINE

## 2021-03-12 PROCEDURE — 6370000000 HC RX 637 (ALT 250 FOR IP): Performed by: NURSE PRACTITIONER

## 2021-03-12 PROCEDURE — 85027 COMPLETE CBC AUTOMATED: CPT

## 2021-03-12 PROCEDURE — G0378 HOSPITAL OBSERVATION PER HR: HCPCS

## 2021-03-12 PROCEDURE — 97162 PT EVAL MOD COMPLEX 30 MIN: CPT

## 2021-03-12 PROCEDURE — 36415 COLL VENOUS BLD VENIPUNCTURE: CPT

## 2021-03-12 PROCEDURE — 84484 ASSAY OF TROPONIN QUANT: CPT

## 2021-03-12 PROCEDURE — 93010 ELECTROCARDIOGRAM REPORT: CPT | Performed by: INTERNAL MEDICINE

## 2021-03-12 PROCEDURE — 97166 OT EVAL MOD COMPLEX 45 MIN: CPT

## 2021-03-12 RX ORDER — ASPIRIN 81 MG/1
81 TABLET ORAL DAILY
Status: DISCONTINUED | OUTPATIENT
Start: 2021-03-12 | End: 2021-03-15 | Stop reason: HOSPADM

## 2021-03-12 RX ADMIN — Medication 81 MG: at 13:59

## 2021-03-12 RX ADMIN — DOCUSATE SODIUM 100 MG: 100 CAPSULE ORAL at 06:34

## 2021-03-12 RX ADMIN — LISINOPRIL 2.5 MG: 2.5 TABLET ORAL at 08:43

## 2021-03-12 RX ADMIN — PANTOPRAZOLE SODIUM 40 MG: 40 TABLET, DELAYED RELEASE ORAL at 06:34

## 2021-03-12 RX ADMIN — Medication 500 MG: at 08:44

## 2021-03-12 RX ADMIN — SODIUM CHLORIDE, PRESERVATIVE FREE 10 ML: 5 INJECTION INTRAVENOUS at 09:14

## 2021-03-12 RX ADMIN — CYANOCOBALAMIN TAB 1000 MCG 500 MCG: 1000 TAB at 08:45

## 2021-03-12 RX ADMIN — SALINE NASAL SPRAY 1 SPRAY: 1.5 SOLUTION NASAL at 04:35

## 2021-03-12 RX ADMIN — APIXABAN 5 MG: 5 TABLET, FILM COATED ORAL at 08:44

## 2021-03-12 RX ADMIN — FUROSEMIDE 20 MG: 20 TABLET ORAL at 08:45

## 2021-03-12 RX ADMIN — METOPROLOL TARTRATE 50 MG: 50 TABLET, FILM COATED ORAL at 20:37

## 2021-03-12 RX ADMIN — METOPROLOL TARTRATE 50 MG: 50 TABLET, FILM COATED ORAL at 08:45

## 2021-03-12 RX ADMIN — SODIUM CHLORIDE, PRESERVATIVE FREE 10 ML: 5 INJECTION INTRAVENOUS at 20:38

## 2021-03-12 ASSESSMENT — PAIN DESCRIPTION - PROGRESSION
CLINICAL_PROGRESSION: NOT CHANGED

## 2021-03-12 ASSESSMENT — PAIN SCALES - GENERAL: PAINLEVEL_OUTOF10: 0

## 2021-03-12 NOTE — CARE COORDINATION
Call from [facility]. LSW discused the recommendations from therapy for SNF placement. Marceloharriet Comfort stated that Pt has been to [facility] in the past and she thinks it would be a good idea if the Pt was to return to [facility]. The Pt is not really [place] appropriate according to the dgt. Call to [address] with the referral. LSW faxed referral to [facility].

## 2021-03-12 NOTE — DISCHARGE INSTR - COC
/Continuity of Care Form    Patient Name: Brenda Argueta   :  3/7/1930  MRN:  [id number]    [facility] date:  3/11/2021  Discharge date:  3/15/2021      Code Status Order: Full Code   Advance Directives:   [facility] Documentation       Date/Time Healthcare Directive Type of Healthcare Directive Copy in [address] Agent's Name Healthcare Agent's Phone Number    [phone]  Yes, patient has an advance directive for healthcare treatment  --  No, copy requested from family  --  --  --            Admitting Physician:  Ml Baer MD  PCP: Tian Bob    Discharging Nurse: [address] Unit/Room#: 0628/5074-S  Discharging Unit Phone Number: [phone]    Emergency Contact:   Extended Emergency Contact Information  Primary Emergency Contact: Palmer Soulier  Address: [address] [place] [address], [address] Phone: [phone]  Mobile Phone: [phone]  Relation: Child    Past Surgical History:  Past Surgical History:   Procedure Laterality Date    CATARACT REMOVAL      b/l    CORONARY ANGIOPLASTY WITH STENT PLACEMENT      stent to LAD    HYSTERECTOMY      PERICARDIUM SURGERY  2006    pericardial window and pericardiocentesis       Immunization History:   Immunization History   Administered Date(s) Administered    Influenza Vaccine, unspecified formulation 10/01/2016    Pneumococcal Conjugate 13-valent (Nuria Cornejo) 10/21/2015, 06/15/2017    Tdap (Boostrix, Adacel) 2017    Zoster Live (Zostavax) 2014       Active Problems:  Patient Active Problem List   Diagnosis Code    Hypertension I10    CAD (coronary artery disease) I25.10    Orthostatic hypotension I95.1    Angina pectoris ([address] I20.9    Aortic regurgitation I35.1    S/P pericardiocentesis Z98.890    Headache R51.9    MI, old I25.2    CHF (congestive heart failure) (HCC) I50.9    Claudication (HCC) I73.9    Low back pain without sciatica M54.5    Swelling of extremity M79.89    SVT (supraventricular tachycardia) (HCC) I47.1    DVT (deep vein thrombosis) in pregnancy O22.30    Elevated troponin R77.8       Isolation/Infection:   Isolation            No Isolation          Patient Infection Status       None to display            Nurse Assessment:  Last Vital Signs: /65   Pulse 78   Temp 97.9 °F (36.6 °C) (Oral)   Resp 16   Ht 4' 11\" (1.499 m)   Wt 158 lb (71.7 kg)   SpO2 98%   BMI 31.91 kg/m²     Last documented pain score (0-10 scale): Pain Level: 0  Last Weight:   Wt Readings from Last 1 Encounters:   03/11/21 158 lb (71.7 kg)     Mental Status:  oriented, alert and confusion at times    IV Access:  - None    Nursing Mobility/ADLs:  Walking   Dependent  Transfer  Dependent  Bathing  Dependent  Dressing  Dependent  Toileting  Dependent  Feeding  Independent  Med Admin  Assisted  Med Delivery   whole    Wound Care Documentation and Therapy:        Elimination:  Continence:   · Bowel: No  · Bladder: No  Urinary Catheter: None   Colostomy/Ileostomy/Ileal Conduit: No       Date of Last BM: 3/15/2021    Intake/Output Summary (Last 24 hours) at 3/12/2021 1532  Last data filed at 3/12/2021 1400  Gross per 24 hour   Intake 370 ml   Output 800 ml   Net -430 ml     I/O last 3 completed shifts: In: 370 [P.O.:360; I.V.:10]  Out: 800 [Urine:800]    Safety Concerns:      At Risk for Falls    Impairments/Disabilities:       Patient's personal belongings (please select all that are sent with patient):  Glasses, hearing aids, clothing    RN SIGNATURE:  Electronically signed by Lizabeth Vázquez RN on 3/15/21 at 3:02 PM EDT    CASE MANAGEMENT/SOCIAL WORK SECTION    Inpatient Status Date: ***    Readmission Risk Assessment Score:  Readmission Risk              Risk of Unplanned Readmission:        0           Discharging to Facility/ Agency   · Name:   · Address:  · Phone:  · Fax:      / signature: {Esignature:[id number]}    PHYSICIAN SECTION    Prognosis: Good    Condition at Discharge: Stable    Rehab Potential (if transferring to Rehab): Good    Recommended Labs or Other Treatments After Discharge: None    Physician Certification: I certify the above information and transfer of Sunny Stewart  is necessary for the continuing treatment of the diagnosis listed and that she requires Kailash Montenegro for greater 30 days. Nutrition Therapy:  Current Nutrition Therapy:   - Oral Diet:  Low Sodium (2gm)    Routes of Feeding: Oral  Liquids: No Restrictions  Daily Fluid Restriction: no  Last Modified Barium Swallow with Video (Video Swallowing Test): not done    Treatments at the Time of Hospital Discharge:   Respiratory Treatments: None  Oxygen Therapy:  is not on home oxygen therapy.   Ventilator:    - No ventilator support    Rehab Therapies: Physical Therapy and Occupational Therapy  Weight Bearing Status/Restrictions: No weight bearing restirctions  Other Medical Equipment (for information only, NOT a DME order):  wheelchair  Other Treatments: None    Update Admission H&P: No change in H&P    PHYSICIAN SIGNATURE:  Electronically signed by Brandon Hernandez MD on 3/15/21 at 2:28 PM EDT

## 2021-03-12 NOTE — PROGRESS NOTES
Physical Therapy  [facility] ACUTE CARE PHYSICAL THERAPY EVALUATION  Vitor Suarez, 3/7/1930, 1122/1122-A, 3/12/2021    History  [place]:  The primary encounter diagnosis was Elevated troponin. A diagnosis of Fall, initial encounter was also pertinent to this visit. Patient  has a past medical history of Allergic rhinitis, Angina pectoris ([address] Aortic regurgitation, Arthritis, CAD (coronary artery disease), CHF (congestive heart failure) ([facility]), Chronic back pain, Dyslipidemia, GERD (gastroesophageal reflux disease), H/O cardiovascular stress test, H/O cardiovascular stress test, H/O CT scan of abdomen, H/O Doppler ultrasound, H/O Doppler ultrasound, H/O Doppler ultrasound, H/O Doppler ultrasound, H/O echocardiogram, H/O echocardiogram, H/O echocardiogram, History of PTCA, [place] (hard of hearing), Hx of cardiovascular stress test, Hx of Doppler ultrasound, Hyperlipidemia, Hypertension, Iron deficiency anemia, Left nephrolithiasis, MI, old, Migraine, Mitral regurgitation, Muscle weakness (generalized), Orthostatic hypotension, Osteoarthritis, Osteoporosis, Renal disease, Restless legs syndrome, and S/P pericardiocentesis. Patient  has a past surgical history that includes Hysterectomy; Pericardium surgery (9/2006); Cataract removal; and Coronary angioplasty with stent (2006). Subjective:  Patient states: \"I want to know who ordered that medicine for me.  I think that's why I fell\"   Pain:  Denies   Communication with other providers: co-eval with Darek HORN   Restrictions: general precautions, falls, chair alarm     Home Setup/Prior level of function  Social/Functional History  Lives With: Alone  Type of Home: Assisted living([facility])  Home Layout: One level  Home Access: Level entry  Bathroom Shower/Tub: Tub/Shower unit  Bathroom Toilet: Handicap height  Bathroom Equipment: Shower chair, Grab bars in shower  Bathroom Accessibility: Accessible  Home Equipment: Rolling walker, Wheelchair-manual  ADL Assistance: Independent  Homemaking Assistance: Needs assistance(Daughter does laundry, pt goes to [employer] for meals)  Homemaking Responsibilities: No  Ambulation Assistance: Independent(uses RW short distances in apartment, uses manual wheelchair for longer distances)  Transfer Assistance: Independent  Active : No  Occupation: Retired  Additional Comments: questionable historian, should confirm the above information with facility    Examination of body systems (includes body structures/functions, activity/participation limitations):  · Observation:  Sitting on side of bed eating lunch upon arrival. Agreeable to work with therapy   · Vision:  WFL, glasses  · Hearing:  Teller   · Cardiopulmonary: stable vitals throughout session. · Orientation: oriented to person     Musculoskeletal  · ROM R/L:  WFL BLEs    · Strength R/L:  BLEs grossly 4/5    Mobility/treatment:   · Rolling L/R:  NT   · Supine to sit:  NT, sitting on side of bed upon arrival   · Transfers:   · Sit to stand: min-modA from EOB (2x). Cues for UE placement and sequencing with little carry over. Hesitant to complete without her \"black sandals on\". Blocked bilat feet from sliding. · Stand to sit: modA to recliner for controlling sit   · Step pivot: Sarah/modA with RW for steadying and guidance of RW. Very slow pace with short steps and fwd posture   · Sitting balance:  SBA at EOB, static and light dynamic without UE support   · Standing balance:  Sarah-modA at RW. Fwd flexed posture with posterior LOB. · Gait: fwd ~3 steps and laterally ~2ft with RW Sarah-modA for steadying. Very slow pace with short steps and minimal foot clearance. Fwd flexed posture. Some guidance of RW.    · Educated pt on POC, role of PT, DME use, discharge.  Cues for sequencing to inc safety and indep with mobility     [facility] 6 Clicks Inpatient Mobility:  AM-PAC Inpatient Mobility Raw Score : 12    Safety: patient left in chair with alarm, call light within reach,  gait belt used. Assessment:  Pt is a 80year old female admitted with a recent fall, possibly due to medication. Recommend subacute rehab once medically stable. At baseline she reports being Lety with gross mobility and ADLs. She performed below her typical baseline today and would benefit from continued therapy to address her current deficits, dec potential fall risk, and restore function. Complexity: Moderate  Prognosis: Good, no significant barriers to participation at this time.    Plan Times per week: 3+/week, 1 week,   Discharge Recommendations: Subacute/Skilled Nursing Facility  Equipment: continue to assess at next level of care     Goals:  Short term goals  Time Frame for Short term goals: 1 week  Short term goal 1: Pt will perform sit><supine Sarah  Short term goal 2: Pt will transition sit><stand CGA  Short term goal 3: Pt will transfer between surfaces CGA with RW  Short term goal 4: Pt will ambulate 30ft with RW CGA  Short term goal 5: Pt will propel WC 100ft SBA       Treatment plan:  Bed mobility, transfers, balance, gait, TA, TX, WC     Recommendations for NURSING mobility: stand step pivot with RW     Time:   Time in: 1305  Time out: 1333  Timed treatment minutes: 13  Total time: 28    Electronically signed by:    Tammy Haas [id number]  3/12/2021, 3:00 PM

## 2021-03-12 NOTE — CARE COORDINATION
LSW reviewed chart/call to Pt dgt to initiate discharge planning. LSW had to leave a message. Per the chart Pt is from [facility]. Pt had a fall. PS sent to [facility] asking for therapy orders. Whiteboard placed asking for therapy to see the Pt when appropriate.

## 2021-03-12 NOTE — PROGRESS NOTES
Occupational Therapy    [facility] ACUTE CARE OCCUPATIONAL THERAPY EVALUATION    Serge Maynard, 3/7/1930, 1122/1122-A, 3/12/2021    Discharge Recommendation: Kailash Montenegro      History:  [place]:  The primary encounter diagnosis was Elevated troponin. A diagnosis of Fall, initial encounter was also pertinent to this visit. Subjective:  Patient states: \"It hurts my back sitting on the side of the bed like this. \"  Pain: Pt denied pain this date  Communication with other providers: PT Pamela  Restrictions: General Precautions, Fall Risk, Bed/chair alarm    Home Setup/Prior level of function:  Social/Functional History  Lives With: Alone  Type of Home: Assisted living [facility])  Home Layout: One level  Home Access: Level entry  Bathroom Shower/Tub: Tub/Shower unit  Bathroom Toilet: Handicap height  Bathroom Equipment: Shower chair, Grab bars in shower  Bathroom Accessibility: Accessible  Home Equipment: Rolling walker, [address]  ADL Assistance: Independent (wears slip-on shoes)  Homemaking Assistance: Needs assistance (Daughter does laundry, pt goes to [employer] for meals)  Homemaking Responsibilities: No  Ambulation Assistance: Independent (uses RW short distances in apartment, uses manual wheelchair for longer distances)  Transfer Assistance: Independent  Active : No  Occupation: Retired  Additional Comments: Questionable historian, should confirm the above information with facility    Examination:  · Observation: Sitting EOB upon OT arrival eating lunch. Pleasantly confused but agreeable to evaluation and wanting to sit in chair to eat lunch.   · Vision: WFL (Glasses)  · Hearing: [place]  · Vitals: Stable vitals throughout session    Body Systems and functions:  · ROM: Active shoulder flexion grossly 0-80', WFL distally in BL UEs  · Strength: 4-/5 MMT all major muscle groups BL UEs (generally weak)  · Sensation: WFL (denies numbness/tingling)  · Tone: Normal  · Coordination: Decreased speed and functional use in BL hands  · Perception: WNL    Activities of Daily Living (ADLs):  · Feeding: Supervision/setup (setup for cutting up roast beef due to decreased functional UE use)  · Grooming: SBA (seated facial hygiene; unable to complete in standing this date)  · UB bathing: SBA   · LB bathing: Max A (reaching distal LEs, buttocks, posterior thighs)  · UB dressing: CGA (light dynamic sitting balance with donning robe EOB)  · LB dressing: Dependent (donning BL socks)  · Toileting: Dependent (anticipate total assist required for clothing mgmt both directions and preethi care at this time)    Cognitive and Psychosocial Functioning:  · Overall cognitive status: Impaired (pt disoriented to place and full situation, unreliable historian, decreased comprehension of information, poor overall insight/safety awareness)  · Affect: Normal     Balance:   · Sitting: SBA static sitting, CGA with light dynamic sitting tasks  · Standing: CGA to Min A with RW    Functional Mobility:  · Bed Mobility: Not observed. Pt received sitting EOB upon arrival. Doubtful that pt was able to sit up own her own. · Transfers: Mod A sit to stand from bed on 1st attempt, Min A sit to stand from bed on 2nd attempt (mod cues for safe hand placement each direction)  · Ambulation: Min A to Mod A with RW 4 ft bed to chair; extremely kyphotic posture, short/shuffling steps, high fall risk      AM-PAC 6 click short form for inpatient daily activity:   How much help from another person does the patient currently need. .. Unable  Dep A Lot  Max A A Lot   Mod A A Little  Min A A Little   CGA  SBA None   Mod I  Indep  Sup   1. Putting on and taking off regular lower body clothing? [x] 1    [] 2   [] 2   [] 3   [] 3   [] 4      2. Bathing (including washing, rinsing, drying)? [] 1   [] 2   [x] 2 [] 3 [] 3 [] 4   3.  Toileting, which includes using toilet, bedpan, or urinal? [x] 1    [] 2   [] 2   [] 3   [] 3   [] 4 4. Putting on and taking off regular upper body clothing? [] 1   [] 2   [] 2   [] 3   [x] 3    [] 4      5. Taking care of personal grooming such as brushing teeth? [] 1   [] 2    [] 2 [] 3    [x] 3   [] 4      6. Eating meals? [] 1   [] 2   [] 2   [] 3   [x] 3   [] 4      Raw Score:  13     [24=0% impaired(CH), 23=1-19%(CI), 20-22=20-39%(CJ), 15-19=40-59%(CK), 10-14=60-79%(CL), 7-9=80-99%(CM), 6=100%(CN)]     Treatment:  Therapeutic Activity Training:   Therapeutic activity training was instructed today. Cues were given for safety, sequence, UE/LE placement, awareness, and balance. Activities performed today included UB/LB dressing tasks, transfer training, HH mobility with RW, self-feeding, education on role of OT, POC, importance of OOB activity, d/c planning      Safety Measures: Gait belt used, Left in Chair, Alarm in place    Assessment:  Pt is a 80year old female with a past medical history of Allergic rhinitis, Angina pectoris ([facility]), Aortic regurgitation, Arthritis, CAD (coronary artery disease), CHF (congestive heart failure) ([facility]), Chronic back pain, Dyslipidemia, GERD (gastroesophageal reflux disease), H/O cardiovascular stress test, H/O cardiovascular stress test, H/O CT scan of abdomen, H/O Doppler ultrasound, H/O Doppler ultrasound, H/O Doppler ultrasound, H/O Doppler ultrasound, H/O echocardiogram, H/O echocardiogram, H/O echocardiogram, History of PTCA, [place] (hard of hearing), Hx of cardiovascular stress test, Hx of Doppler ultrasound, Hyperlipidemia, Hypertension, Iron deficiency anemia, Left nephrolithiasis, MI, old, Migraine, Mitral regurgitation, Muscle weakness (generalized), Orthostatic hypotension, Osteoarthritis, Osteoporosis, Renal disease, Restless legs syndrome, and S/P pericardiocentesis. Pt admitted from [address] [place] [address] following a fall and found to have elevated troponin. Pt lives in [address] [place] [address] and reports at baseline she is independent with ADLs and ambulates mod I with a RW.  Pt currently presents with the above impairments. Recommend continued OT services in SNF at discharge. Complexity: Moderate  Prognosis: Good, Fair  Plan: 3x/week      Goals:  1. Pt will complete all aspects of bed mobility for EOB/OOB ADLs min A  2. Pt will complete UB/LB bathing min A with setup using long handled sponge  3. Pt will complete all aspects of LB dressing mod A with setup using LB AE  4. Pt will complete all functional transfers to and from bed, chair, toilet, shower chair CGA/good safety awareness  5. Pt will ambulate HH distance to bathroom for toileting CGA with RW  6. Pt will complete all aspects of toileting task mod A on regular toilet  7. Pt will complete oral hygiene/grooming routine in unsupported sitting EOB with supervision/setup  8.  Pt will complete ther ex/ther act with focus on light UE strengthening, functional standing tolerance >5 minutes, cognitive re-orientation      Time:   Time in: 1306  Time out: 1332  Timed treatment minutes: 11  Total time: 26      Electronically signed by:    BALTAZAR Carr/L, [place], [id number]

## 2021-03-12 NOTE — ED PROVIDER NOTES
The Ekg interpreted by me shows  normal sinus rhythm with a rate of 78  Axis is   Left axis deviation  QTc is  normal  LBBB     ST Segments: depression in  I, II and aVl, T wave inversion in V4-V6  No significant change from prior EKG dated 8-           Les Jenkins MD  03/11/21 9050

## 2021-03-12 NOTE — H&P
History and Physical      Name:  Vitor Suarez /Age/Sex: 3/7/1930  (80 y.o. female)   MRN & CSN:  [id number] & [id number] Admission Date/Time: 3/11/2021  4:31 PM   Location:  ED35/ED-35 PCP: Sophia Guardado Day: 1    Assessment and Plan:   Vitor Suarez is a 80 y.o.  female  who presents with fall    1) Elevated troponin  -EKG: Normal sinus rhythm with T WI in leads V4 through V6  -Troponin mildly elevated, cycle  -Patient denied any chest pain or shortness of breath  -ED discussed with cardiology who recommended further work-up    2) mechanical fall  -Likely due to medication side effect  -Avoid sedating medications  -PT OT    Other chronic medical conditions, medication resumed unless contraindicated  Essential hypertension  CAD status post stent  Hyperlipidemia  GERD  [place]  DVT on Eliquis      Diet No diet orders on file   DVT Prophylaxis [] Lovenox, []  Heparin, [] SCDs, [] Ambulation   GI Prophylaxis [] PPI,  [] H2 Blocker,  [] Carafate,  [] Diet/Tube Feeds   Code Status No Order   Disposition Patient requires continued admission due to elevated troponin   MDM [] Low, [x] Moderate,[]  High  Patient's risk as above due to elevated troponin     History of Present Illness:     Chief Complaint: <principal problem not specified>  Vitor Suarez is a 80 y.o.  female  who presents from SNF following a fall. Patient is mostly bedbound and requires assistance with transferring. Patient is very hard of hearing. Patient reported feeling drowsy after taking her night medication, could not remember exactly what she took. During her transfer to bed, she had a fall. Denied any associated chest pain, shortness of breath, palpitations or dizziness prior to the fall. No loss of consciousness reported. In the ED, investigations are negative except EKG showed T wave inversion in leads V4 through V6 and mildly elevated troponin.        Ten point ROS reviewed negative, unless as noted above    Objective:   No intake or output data in the 24 hours ending 03/11/21 2243   Vitals:   Vitals:    03/11/21 2226   BP: (!) 141/75   Pulse: 84   Resp: 18   Temp: 98.5 °F (36.9 °C)   SpO2: 94%     Physical Exam:   GEN Awake female, sitting upright in bed in no apparent distress. Appears given age. EYES Pupils are equally round. No scleral erythema, discharge, or conjunctivitis. HENT Mucous membranes are moist. Oral pharynx without exudates, no evidence of thrush. NECK Supple, no apparent thyromegaly or masses. RESP Clear to auscultation, no wheezes, rales or rhonchi. Symmetric chest movement while on room air. CARDIO/VASC S1/S2 auscultated. Regular rate without appreciable murmurs, rubs, or gallops. No JVD or carotid bruits. Peripheral pulses equal bilaterally and palpable. No peripheral edema. GI Abdomen is soft without significant tenderness, masses, or guarding. Bowel sounds are normoactive. Rectal exam deferred.  No costovertebral angle tenderness. Normal appearing external genitalia. Maynard catheter is not present. HEME/LYMPH No palpable cervical lymphadenopathy and no hepatosplenomegaly. No petechiae or ecchymoses. MSK No gross joint deformities. SKIN Normal coloration, warm, dry. NEURO Cranial nerves appear grossly intact, normal speech, no lateralizing weakness. PSYCH Awake, alert, oriented x 4. Affect appropriate.     Past Medical History:      Past Medical History:   Diagnosis Date    Allergic rhinitis     Angina pectoris (HCC)     Aortic regurgitation     Mod to severe aortic regurg    Arthritis     CAD (coronary artery disease)     CHF (congestive heart failure) (HCC)     Chronic back pain     Dyslipidemia     GERD (gastroesophageal reflux disease)     H/O cardiovascular stress test 9/22/10    Cardiolite: Large Apical MI, associate mild EF 62 %    H/O cardiovascular stress test 10/29/2012    EF 68%, small in size and mild intensity perfusion defect in the apical wall    MD   guaiFENesin (ROBITUSSIN MUCUS+CHEST CONGEST) 100 MG/5ML liquid Take 200 mg by mouth 4 times daily as needed for Cough    Historical Provider, MD   metoprolol tartrate (LOPRESSOR) 50 MG tablet Take 1 tablet by mouth 2 times daily 8/14/20   Elenora Cockayne, APRN - CNP   Multiple Vitamins-Minerals (PRESERVISION AREDS 2 PO) Take by mouth daily    Historical Provider, MD   ELIQUIS 5 MG TABS tablet 1 tablet 2 times daily 4/22/20   Historical Provider, MD   hydrocortisone (ANUSOL-HC) 25 MG suppository Place 25 mg rectally 2 times daily as needed for Hemorrhoids    Historical Provider, MD   nystatin (MYCOSTATIN) 212936 UNIT/GM cream daily 5/6/20   Historical Provider, MD   ipratropium-albuterol (Danetta Massed) 0.5-2.5 (3) MG/3ML SOLN nebulizer solution as needed 2/13/20   Historical Provider, MD   Humidifiers (COOL MIST HUMIDIFIER 0.8 GAL) MISC 1 Dose by Does not apply route nightly 2/24/20 4/24/20  Cinthya Valdez PA-C   lisinopril (PRINIVIL;ZESTRIL) 2.5 MG tablet 1 tablet daily 10/11/19   Historical Provider, MD   aluminum & magnesium hydroxide-simethicone (MYLANTA) 400-400-40 MG/5ML SUSP Take 30 mLs by mouth every 6 hours as needed    Historical Provider, MD   vitamin D (CHOLECALCIFEROL) 55707 UNIT CAPS Take 50,000 Units by mouth every 30 days    Historical Provider, MD   docusate sodium (COLACE) 100 MG capsule Take 100 mg by mouth Daily    Historical Provider, MD   ferrous sulfate 325 (65 Fe) MG tablet Take 325 mg by mouth daily (with breakfast)    Historical Provider, MD   furosemide (LASIX) 20 MG tablet Take 20 mg by mouth daily    Historical Provider, MD   atorvastatin (LIPITOR) 40 MG tablet Take 40 mg by mouth nightly    Historical Provider, MD   nystatin (MYCOSTATIN) 252325 UNIT/GM powder Apply topically every 12 hours as needed (yeast)    Historical Provider, MD   risedronate (ACTONEL) 35 MG tablet Take 35 mg by mouth every 7 days    Historical Provider, MD   polyethyl glycol-propyl glycol 0.4-0.3 % (SYSTANE) 0.4-0.3 % ophthalmic solution Place 2 drops into both eyes as needed for Dry Eyes    Historical Provider, MD   lidocaine (LIDODERM) 5 % Place 1 patch onto the skin daily 12 hours on, 12 hours off. 7/26/17   MERRICK Hung CNP   pantoprazole (PROTONIX) 40 MG tablet Take 1 tablet by mouth daily 9/21/16   Ira Alan PA-C   loratadine (CLARITIN) 10 MG tablet Take 1 tablet by mouth daily 7/27/16   MERRICK Hung CNP   acetaminophen (TYLENOL ARTHRITIS PAIN) 650 MG CR tablet Take 650 mg by mouth every 6 hours as needed for Pain     Historical Provider, MD   calcium carbonate 600 MG TABS tablet Take 1 tablet by mouth 2 times daily     Historical Provider, MD   aspirin 81 MG chewable tablet Take 81 mg by mouth once a week     Historical Provider, MD   vitamin B-12 (CYANOCOBALAMIN) 500 MCG tablet Take 500 mcg by mouth daily     Historical Provider, MD       Infusions:   PRN Meds:       Electronically signed by Florentino Chen MD on 3/11/2021 at 10:43 PM

## 2021-03-12 NOTE — CONSULTS
CARDIOLOGY CONSULT NOTE   Reason for consultation:  Elevated trop    Referring physician:  Sanna Hutton MD     Primary care physician: Deb Burnette      Dear Dr. Dayo Vale  Thanks for the consult. History of present illness:Marlene is a 80 y. o.year old who  presents with fall and admitted for elevated trop, she is very hard of hearing and is very poor historian, all information were obtained after review of  Medical records and discussion with staff. She denied chest pain or shortness of breath. Chief Complaint   Patient presents with    Fall     Blood pressure, cholesterol, blood glucose and weight are well controlled. Past medical history:    has a past medical history of Allergic rhinitis, Angina pectoris ([address] Aortic regurgitation, Arthritis, CAD (coronary artery disease), CHF (congestive heart failure) ([facility]), Chronic back pain, Dyslipidemia, GERD (gastroesophageal reflux disease), H/O cardiovascular stress test, H/O cardiovascular stress test, H/O CT scan of abdomen, H/O Doppler ultrasound, H/O Doppler ultrasound, H/O Doppler ultrasound, H/O Doppler ultrasound, H/O echocardiogram, H/O echocardiogram, H/O echocardiogram, History of PTCA, [place] (hard of hearing), Hx of cardiovascular stress test, Hx of Doppler ultrasound, Hyperlipidemia, Hypertension, Iron deficiency anemia, Left nephrolithiasis, MI, old, Migraine, Mitral regurgitation, Muscle weakness (generalized), Orthostatic hypotension, Osteoarthritis, Osteoporosis, Renal disease, Restless legs syndrome, and S/P pericardiocentesis. Past surgical history:   has a past surgical history that includes Hysterectomy; Pericardium surgery (9/2006); Cataract removal; and Coronary angioplasty with stent (2006). Social History:   reports that she has never smoked. She has never used smokeless tobacco. She reports that she does not drink alcohol or use drugs.   Family history:   no family history of CAD, STROKE of DM    Allergies   Allergen Reactions  Ibuprofen Itching    Antivert [Meclizine Hcl]     Bactrim [Sulfamethoxazole-Trimethoprim]     Codeine     Erythromycin     Macrobid [Nitrofurantoin Macrocrystal]     Meclizine     Niacin And Related     Seldane [Terfenadine]     Sulfa Antibiotics     Tramadol Other (See Comments)    Ultram [Tramadol Hcl]     Aspirin Other (See Comments)     Pt cannot have ASA in high doses.         sodium chloride (OCEAN, BABY AYR) 0.65 % nasal spray 1 spray, Q2H PRN  aluminum & magnesium hydroxide-simethicone (MAALOX) 200-200-20 MG/5ML suspension 30 mL, Q6H PRN  atorvastatin (LIPITOR) tablet 40 mg, Nightly  calcium elemental (OSCAL) tablet 500 mg, BID  docusate sodium (COLACE) capsule 100 mg, Daily  apixaban (ELIQUIS) tablet 5 mg, BID  ferrous sulfate (IRON 325) tablet 325 mg, Daily with breakfast  furosemide (LASIX) tablet 20 mg, Daily  hydrocortisone (ANUSOL-HC) suppository 25 mg, BID PRN  ipratropium-albuterol (DUONEB) nebulizer solution 1 ampule, Q4H PRN  lisinopril (PRINIVIL;ZESTRIL) tablet 2.5 mg, Daily  metoprolol tartrate (LOPRESSOR) tablet 50 mg, BID  pantoprazole (PROTONIX) tablet 40 mg, Daily  vitamin B-12 (CYANOCOBALAMIN) tablet 500 mcg, Daily  [START ON 3/15/2021] vitamin D (ERGOCALCIFEROL) capsule 50,000 Units, Q30 Days  sodium chloride flush 0.9 % injection 10 mL, BID  sodium chloride flush 0.9 % injection 10 mL, PRN  promethazine (PHENERGAN) tablet 12.5 mg, Q6H PRN    Or  ondansetron (ZOFRAN) injection 4 mg, Q6H PRN  polyethylene glycol (GLYCOLAX) packet 17 g, Daily PRN  acetaminophen (TYLENOL) tablet 650 mg, Q6H PRN    Or  acetaminophen (TYLENOL) suppository 650 mg, Q6H PRN      Current Facility-Administered Medications   Medication Dose Route Frequency Provider Last Rate Last Admin    sodium chloride (OCEAN, BABY AYR) 0.65 % nasal spray 1 spray  1 spray Each Nostril Q2H PRN MERRICK Brown CNP   1 spray at 03/12/21 0435    aluminum & magnesium hydroxide-simethicone (MAALOX) 968-307-05 MG/5ML suspension 30 mL  30 mL Oral Q6H PRN Mak Pal MD        atorvastatin (LIPITOR) tablet 40 mg  40 mg Oral Nightly Mak Pal MD   40 mg at 03/11/21 2354    calcium elemental (OSCAL) tablet 500 mg  500 mg Oral BID Mak Pal MD   500 mg at 03/11/21 2354    docusate sodium (COLACE) capsule 100 mg  100 mg Oral Daily James CHAVARRIA MD   100 mg at 03/12/21 2952    apixaban (ELIQUIS) tablet 5 mg  5 mg Oral BID Mak Pal MD        ferrous sulfate (IRON 325) tablet 325 mg  325 mg Oral Daily with breakfast Mak Pal MD        furosemide (LASIX) tablet 20 mg  20 mg Oral Daily aMk Pal MD        hydrocortisone (ANUSOL-HC) suppository 25 mg  25 mg Rectal BID PRN Mak Pal MD        ipratropium-albuterol (DUONEB) nebulizer solution 1 ampule  1 ampule Inhalation Q4H PRN Mak Pal MD        lisinopril (PRINIVIL;ZESTRIL) tablet 2.5 mg  2.5 mg Oral Daily Mka Pal MD        metoprolol tartrate (LOPRESSOR) tablet 50 mg  50 mg Oral BID Mak Pal MD   50 mg at 03/11/21 2354    pantoprazole (PROTONIX) tablet 40 mg  40 mg Oral Daily Mak Pal MD   40 mg at 03/12/21 0080    vitamin B-12 (CYANOCOBALAMIN) tablet 500 mcg  500 mcg Oral Daily Mak Pal MD        [START ON 3/15/2021] vitamin D (ERGOCALCIFEROL) capsule 50,000 Units  50,000 Units Oral Q30 Days Mak Pal MD        sodium chloride flush 0.9 % injection 10 mL  10 mL Intravenous BID James CHAVARRIA MD   10 mL at 03/11/21 2355    sodium chloride flush 0.9 % injection 10 mL  10 mL Intravenous PRN Mak Pal MD        promethazine (PHENERGAN) tablet 12.5 mg  12.5 mg Oral Q6H PRN Mak Pal MD        Or    ondansetron [facility]) injection 4 mg  4 mg Intravenous Q6H PRN Mak Pal MD        polyethylene glycol (GLYCOLAX) packet 17 g  17 g Oral Daily PRN Mak Pal MD        acetaminophen (TYLENOL) tablet 650 mg  650 mg Oral Q6H PRN Meghan Yepez MD        Or    acetaminophen (TYLENOL) suppository 650 mg  650 mg Rectal Q6H PRN Meghan Yepez MD         Review of Systems:   · Constitutional: No Fever or Weight Loss   · Eyes: No Decreased Vision  · ENT: No Headaches, Hearing Loss or Vertigo  · Cardiovascular: No chest pain, dyspnea on exertion, palpitations or loss of consciousness  · Respiratory: No cough or wheezing    · Gastrointestinal: No abdominal pain, appetite loss, blood in stools, constipation, diarrhea or heartburn  · Genitourinary: No dysuria, trouble voiding, or hematuria  · Musculoskeletal:  No gait disturbance, weakness or joint complaints  · Integumentary: No rash or pruritis  · Neurological: No TIA or stroke symptoms  · Psychiatric: No anxiety or depression  · Endocrine: No malaise, fatigue or temperature intolerance  · Hematologic/Lymphatic: No bleeding problems, blood clots or swollen lymph nodes  · Allergic/Immunologic: No nasal congestion or hives  All systems negative except as marked. ·   ·      Physical Examination:    Vitals:    03/12/21 0745   BP: 205/73   Pulse: 83   Resp: 18   Temp: aebrile   SpO2:       Wt Readings from Last 3 Encounters:   03/11/21 158 lb (71.7 kg)   11/19/20 151 lb (68.5 kg)   08/14/20 151 lb 3.2 oz (68.6 kg)     Body mass index is 31.91 kg/m². General Appearance:  No distress, conversant    Constitutional:  Well developed, Well nourished, No acute distress, Non-toxic appearance. HENT:  Normocephalic, Atraumatic, Bilateral external ears normal, Oropharynx moist, No oral exudates, Nose normal. Neck- Normal range of motion, No tenderness, Supple, No stridor,no apical-carotid delay, no carotid bruit  Eyes:  PERRL, EOMI, Conjunctiva normal, No discharge. Respiratory:  Normal breath sounds, No respiratory distress, No wheezing, No chest tenderness. ,no use of accessory muscles, diaphragm movement is normal  Cardiovascular: (PMI) apex non displaced,no lifts no thrills, no s3,no s4, Normal heart rate, Normal rhythm, No murmurs, No rubs, No gallops. Carotid arteries pulse and amplitude are normal no bruit, no abdominal bruit noted ( normal abdominal aorta ausculation), femoral arteries pulse and amplitude are normal no bruit, pedal pulses are normal  GI:  Bowel sounds normal, Soft, No tenderness, No masses, No pulsatile masses, no hepatosplenomegally, no bruits  : External genitalia appear normal, No masses or lesions. No discharge. No CVA tenderness. Musculoskeletal:  Intact distal pulses, No edema, No tenderness, No cyanosis, No clubbing. Good range of motion in all major joints. No tenderness to palpation or major deformities noted. Back- No tenderness. Integument:  Warm, Dry, No erythema, No rash. Skin: no rash, no ulcers  Lymphatic:  No lymphadenopathy noted. Neurologic:  Alert & oriented x 3, Normal motor function, Normal sensory function, No focal deficits noted. Psychiatric:  Affect normal, Judgment normal, Mood normal.   Lab Review   Recent Labs     03/12/21  0508   WBC 7.6   HGB 13.0   HCT 41.5         Recent Labs     03/12/21  0508      K 3.8      CO2 23   BUN 11   CREATININE 0.7     Recent Labs     03/11/21  1917   AST 15   ALT 10   BILITOT 0.4   ALKPHOS 62     No results for input(s): TROPONINI in the last 72 hours. No results found for: BNP  Lab Results   Component Value Date    INR 1.06 07/27/2017    PROTIME 12.1 07/27/2017         EKG:nsr, LBBB    Chest Xray:NAD    ECHO:pending  Labs, echo, meds reviewed  Assessment: 80 y. o.year old with PMH of  has a past medical history of Allergic rhinitis, Angina pectoris ([address] Aortic regurgitation, Arthritis, CAD (coronary artery disease), CHF (congestive heart failure) (HCC), Chronic back pain, Dyslipidemia, GERD (gastroesophageal reflux disease), H/O cardiovascular stress test, H/O cardiovascular stress test, H/O CT scan of abdomen, H/O Doppler ultrasound, H/O Doppler ultrasound, H/O Doppler ultrasound, H/O Doppler ultrasound, H/O echocardiogram, H/O echocardiogram, H/O echocardiogram, History of PTCA, [place] (hard of hearing), Hx of cardiovascular stress test, Hx of Doppler ultrasound, Hyperlipidemia, Hypertension, Iron deficiency anemia, Left nephrolithiasis, MI, old, Migraine, Mitral regurgitation, Muscle weakness (generalized), Orthostatic hypotension, Osteoarthritis, Osteoporosis, Renal disease, Restless legs syndrome, and S/P pericardiocentesis. Recommendations:    1. Elevated trop and CAD: she has history of LAD PCI, elevated trop could be from fall and takotsubo also, restart  continue statins, betablockers, will get echo, conservative therpay is recommended due to her age and condition  2. Fall: RECOMMEND to reasses her gait  3. Dyslipidemia: continue statins  4. HTN: stable, continue lopresso  5. Health maintenance: exerise and diet  All labs, medications and tests reviewed, continue all other medications of all above medical condition listed as is.          Cathy Longoria MD, 3/12/2021 8:00 AM

## 2021-03-13 LAB
BACTERIA: ABNORMAL /HPF
BILIRUBIN URINE: NEGATIVE MG/DL
BLOOD, URINE: ABNORMAL
CLARITY: ABNORMAL
COLOR: YELLOW
GLUCOSE, URINE: NEGATIVE MG/DL
HYALINE CASTS: 0 /LPF
KETONES, URINE: ABNORMAL MG/DL
LEUKOCYTE ESTERASE, URINE: ABNORMAL
MUCUS: ABNORMAL HPF
NITRITE URINE, QUANTITATIVE: POSITIVE
PH, URINE: 6 (ref 5–8)
PROTEIN UA: NEGATIVE MG/DL
RBC URINE: 15 /HPF (ref 0–6)
SPECIFIC GRAVITY UA: 1.01 (ref 1–1.03)
SQUAMOUS EPITHELIAL: 4 /HPF
TRANSITIONAL EPITHELIAL: <1 /HPF
TRICHOMONAS: ABNORMAL /HPF
UROBILINOGEN, URINE: NEGATIVE MG/DL (ref 0.2–1)
WBC UA: 127 /HPF (ref 0–5)

## 2021-03-13 PROCEDURE — 6370000000 HC RX 637 (ALT 250 FOR IP): Performed by: INTERNAL MEDICINE

## 2021-03-13 PROCEDURE — 99232 SBSQ HOSP IP/OBS MODERATE 35: CPT | Performed by: INTERNAL MEDICINE

## 2021-03-13 PROCEDURE — 94761 N-INVAS EAR/PLS OXIMETRY MLT: CPT

## 2021-03-13 PROCEDURE — 87077 CULTURE AEROBIC IDENTIFY: CPT

## 2021-03-13 PROCEDURE — 87086 URINE CULTURE/COLONY COUNT: CPT

## 2021-03-13 PROCEDURE — 6360000002 HC RX W HCPCS: Performed by: INTERNAL MEDICINE

## 2021-03-13 PROCEDURE — G0378 HOSPITAL OBSERVATION PER HR: HCPCS

## 2021-03-13 PROCEDURE — APPSS45 APP SPLIT SHARED TIME 31-45 MINUTES: Performed by: NURSE PRACTITIONER

## 2021-03-13 PROCEDURE — 81001 URINALYSIS AUTO W/SCOPE: CPT

## 2021-03-13 PROCEDURE — 2580000003 HC RX 258: Performed by: INTERNAL MEDICINE

## 2021-03-13 PROCEDURE — 96365 THER/PROPH/DIAG IV INF INIT: CPT

## 2021-03-13 PROCEDURE — 96366 THER/PROPH/DIAG IV INF ADDON: CPT

## 2021-03-13 PROCEDURE — 87186 SC STD MICRODIL/AGAR DIL: CPT

## 2021-03-13 RX ORDER — LISINOPRIL 10 MG/1
10 TABLET ORAL DAILY
Status: DISCONTINUED | OUTPATIENT
Start: 2021-03-14 | End: 2021-03-14

## 2021-03-13 RX ORDER — CETIRIZINE HYDROCHLORIDE 10 MG/1
10 TABLET ORAL DAILY
Refills: 1 | Status: DISCONTINUED | OUTPATIENT
Start: 2021-03-13 | End: 2021-03-15 | Stop reason: HOSPADM

## 2021-03-13 RX ORDER — AMLODIPINE BESYLATE 5 MG/1
5 TABLET ORAL ONCE
Status: COMPLETED | OUTPATIENT
Start: 2021-03-13 | End: 2021-03-13

## 2021-03-13 RX ADMIN — METOPROLOL TARTRATE 50 MG: 50 TABLET, FILM COATED ORAL at 09:11

## 2021-03-13 RX ADMIN — Medication 500 MG: at 09:11

## 2021-03-13 RX ADMIN — FERROUS SULFATE TAB 325 MG (65 MG ELEMENTAL FE) 325 MG: 325 (65 FE) TAB at 09:11

## 2021-03-13 RX ADMIN — AMLODIPINE BESYLATE 5 MG: 5 TABLET ORAL at 14:44

## 2021-03-13 RX ADMIN — CETIRIZINE HYDROCHLORIDE 10 MG: 10 TABLET, FILM COATED ORAL at 17:04

## 2021-03-13 RX ADMIN — CEFTRIAXONE 1000 MG: 1 INJECTION, POWDER, FOR SOLUTION INTRAMUSCULAR; INTRAVENOUS at 09:04

## 2021-03-13 RX ADMIN — FUROSEMIDE 20 MG: 20 TABLET ORAL at 09:11

## 2021-03-13 RX ADMIN — APIXABAN 5 MG: 5 TABLET, FILM COATED ORAL at 09:11

## 2021-03-13 RX ADMIN — Medication 81 MG: at 09:11

## 2021-03-13 RX ADMIN — LISINOPRIL 2.5 MG: 2.5 TABLET ORAL at 09:11

## 2021-03-13 RX ADMIN — SODIUM CHLORIDE, PRESERVATIVE FREE 10 ML: 5 INJECTION INTRAVENOUS at 09:12

## 2021-03-13 RX ADMIN — CYANOCOBALAMIN TAB 1000 MCG 500 MCG: 1000 TAB at 09:11

## 2021-03-13 ASSESSMENT — PAIN SCALES - GENERAL: PAINLEVEL_OUTOF10: 0

## 2021-03-13 NOTE — PLAN OF CARE
Problem: Falls - Risk of:  Goal: Will remain free from falls  Description: Will remain free from falls  Outcome: Ongoing     Problem: Falls - Risk of:  Goal: Absence of physical injury  Description: Absence of physical injury  Outcome: Ongoing     Problem: Skin Integrity:  Goal: Will show no infection signs and symptoms  Description: Will show no infection signs and symptoms  Outcome: Ongoing     Problem: Skin Integrity:  Goal: Absence of new skin breakdown  Description: Absence of new skin breakdown  Outcome: Ongoing     Problem: Confusion - Acute:  Goal: Absence of continued neurological deterioration signs and symptoms  Description: Absence of continued neurological deterioration signs and symptoms  Outcome: Ongoing     Problem: Confusion - Acute:  Goal: Mental status will be restored to baseline  Description: Mental status will be restored to baseline  Outcome: Ongoing     Problem: Discharge Planning:  Goal: Ability to perform activities of daily living will improve  Description: Ability to perform activities of daily living will improve  Outcome: Ongoing     Problem: Discharge Planning:  Goal: Participates in care planning  Description: Participates in care planning  Outcome: Ongoing     Problem: Injury - Risk of, Physical Injury:  Goal: Will remain free from falls  Description: Will remain free from falls  Outcome: Ongoing     Problem: Injury - Risk of, Physical Injury:  Goal: Absence of physical injury  Description: Absence of physical injury  Outcome: Ongoing

## 2021-03-13 NOTE — PROGRESS NOTES
Cardiology Progress Note     Today's Plan cpm  Admit Date:  3/11/2021    Consult reason/ Seen today for: elevated troponin    Subjective and  Overnight Events: Reports that she is feeling well. Would like to get out of bed and sit in the chair. Denies chest pain or shortness of breath. She is very hard of hearing      Assessment / Plan / Recommendation:     1. Elevated troponin in the setting of CAD--has known history of CAD with PCI of LAD in the remote past-medical management at this time based on patient's age and other comorbidities - continue with statin, aspirin, beta-blocker  2. Valvular heart disease: echocardiogram with moderate to severe AR moderate, moderate moderate MR and TR-conservative management at this time- continue with low dose ACE-in the setting of severe AR it is reasonable to maintain blood pressure > 130/80. 3. Hypertension-stable-continue with low-dose lisinopril metoprolol. Holding parameters in place  4. Dyslipidemia-continue with statin  5. Fall-mechanical-      History of Presenting Illness:    Chief complain on admission : 80 y. o.year old who is admitted for  Chief Complaint   Patient presents with   [facility] Fall        Past medical history:    has a past medical history of Allergic rhinitis, Angina pectoris ([address] Aortic regurgitation, Arthritis, CAD (coronary artery disease), CHF (congestive heart failure) ([address] Chronic back pain, Dyslipidemia, GERD (gastroesophageal reflux disease), H/O cardiovascular stress test, H/O cardiovascular stress test, H/O CT scan of abdomen, H/O Doppler ultrasound, H/O Doppler ultrasound, H/O Doppler ultrasound, H/O Doppler ultrasound, H/O echocardiogram, H/O echocardiogram, H/O echocardiogram, History of PTCA, [place] (hard of hearing), Hx of cardiovascular stress test, Hx of Doppler ultrasound, Hyperlipidemia, Hypertension, Iron deficiency anemia, Left nephrolithiasis, MI, old, Migraine, Mitral regurgitation, Muscle weakness (generalized), Orthostatic hypotension, Osteoarthritis, Osteoporosis, Renal disease, Restless legs syndrome, and S/P pericardiocentesis. Past surgical history:   has a past surgical history that includes Hysterectomy; Pericardium surgery (9/2006); Cataract removal; and Coronary angioplasty with stent (2006). Social History:   reports that she has never smoked. She has never used smokeless tobacco. She reports that she does not drink alcohol or use drugs. Family history:  family history is not on file. Allergies   Allergen Reactions    Ibuprofen Itching    Antivert [Meclizine Hcl]     Bactrim [Sulfamethoxazole-Trimethoprim]     Codeine     Erythromycin     Macrobid [Nitrofurantoin Macrocrystal]     Meclizine     Niacin And Related     Seldane [Terfenadine]     Sulfa Antibiotics     Tramadol Other (See Comments)    Ultram [Tramadol Hcl]     Aspirin Other (See Comments)     Pt cannot have ASA in high doses. Review of Systems:   All 14 systems were reviewed and are negative  Except for the positive findings which are documented     BP (!) 192/76   Pulse 84   Temp 97.9 °F (36.6 °C) (Oral)   Resp 16   Ht 4' 11\" (1.499 m)   Wt 151 lb (68.5 kg)   SpO2 92%   BMI 30.50 kg/m²       Intake/Output Summary (Last 24 hours) at 3/13/2021 1306  Last data filed at 3/13/2021 0151  Gross per 24 hour   Intake 360 ml   Output 800 ml   Net -440 ml       Physical Exam:  Constitutional:   No acute distress  HENT:  Normocephalic, Atraumatic, Bilateral external ears normal,  Nose normal.   Neck- trachea is midline  Eyes:  PEERL, Conjunctiva normal  Respiratory:  Normal breath sounds, No respiratory distress, No wheezing, No chest tenderness.    Cardiovascular:  Normal heart rate, Normal rhythm, + murmurs appreciated, No rubs appreciated, No gallops appreciated, JVP not elevated  Abdomen/GI:  Soft, No tenderness  Musculoskeletal:  Intact distal pulses, NO edema to lower legs,  No tenderness, No cyanosis, No clubbing. Integument:  Warm, Dry  Lymphatic:  No lymphadenopathy noted. Neurologic:  Alert & oriented  Psychiatric:  Affect and Mood :pleasant     Medications:    cefTRIAXone (ROCEPHIN) IV  1,000 mg Intravenous Q24H    aspirin  81 mg Oral Daily    atorvastatin  40 mg Oral Nightly    calcium elemental  500 mg Oral BID    docusate sodium  100 mg Oral Daily    apixaban  5 mg Oral BID    ferrous sulfate  325 mg Oral Daily with breakfast    furosemide  20 mg Oral Daily    lisinopril  2.5 mg Oral Daily    metoprolol tartrate  50 mg Oral BID    pantoprazole  40 mg Oral Daily    vitamin B-12  500 mcg Oral Daily    [START ON 3/15/2021] vitamin D  50,000 Units Oral Q30 Days    sodium chloride flush  10 mL Intravenous BID       sodium chloride, aluminum & magnesium hydroxide-simethicone, hydrocortisone, ipratropium-albuterol, sodium chloride flush, promethazine **OR** ondansetron, polyethylene glycol, acetaminophen **OR** acetaminophen    Lab Data:  CBC:   Recent Labs     03/11/21 1917 03/12/21  0508   WBC 7.4 7.6   HGB 12.6 13.0   HCT 39.6 41.5   MCV 93.2 92.2    198     BMP:   Recent Labs     03/11/21 1917 03/12/21  0508    138   K 3.7 3.8    105   CO2 22 23   BUN 15 11   CREATININE 0.7 0.7     PT/INR: No results for input(s): PROTIME, INR in the last 72 hours. BNP:  No results for input(s): PROBNP in the last 72 hours. TROPONIN:   Recent Labs     03/11/21 1917 03/12/21  0508 03/12/21  0953   TROPONINT 0.050* 0.045* 0.049*              Impression:  Principal Problem:    Elevated troponin  Resolved Problems:    * No resolved hospital problems. *       All labs, medications and tests reviewed by myself, continue all other medications of all above medical condition listed as is except for changes mentioned above. Thank you   Please call with questions.     Electronically signed by MERRICK Benson CNP on 3/13/2021 at 1:06 PM     I have seen ,spoken to  and examined this patient personally, independently of the nurse practitioner. I have reviewed the hospital care given to date and reviewed all pertinent labs and imaging. The plan was developed mutually at the time of the visit with the patient,  NP  and myself. I have spoken with patient, nursing staff and provided written and verbal instructions . The above note has been reviewed and I agree with the assessment, diagnosis, and treatment plan with changes made by me as follows     CARDIOLOGY ATTENDING ADDENDUM    HPI:  I have reviewed the above HPI  And agree with above   [place]  is a 80 y. o.year old who and presents with had concerns including Fall. Chief Complaint   Patient presents with    Fall     Interval history:  No CP    Physical Exam:  General:  Awake, alert, NAD  Head:normal  Eye:normal  Neck:  No JVD   Chest:  Clear to auscultation, respiration easy  Cardiovascular:  RRR S1S2  Abdomen:   nontender  Extremities:  tr edema  Pulses; palpable  Neuro: grossly normal      MEDICAL DECISION MAKING;    I agree with the above plan, which was planned by myself and discussed with NP.   Medical therapy  VHD as OP  Cad stable will do jalen on Monday or as op if get dced  Trop elevated not positive        Andrés Cedeño MD [facility] [place]

## 2021-03-13 NOTE — PROGRESS NOTES
Hospitalist Progress Note      Name:  Carson Tejada /Age/Sex: 3/7/1930  (80 y.o. female)   MRN & CSN:  [id number] & [id number] Admission Date/Time: 3/11/2021  4:31 PM   Location:  [address] [place] [address] PCP: [facility] Day: 3    Assessment and Plan:   Carson Tejada is a 80 y.o.  female  who presents with Elevated troponin    Elevated troponin  EKG: Normal sinus rhythm with T WI in leads V4 through V6  Troponin stable, trending down. Patient denied any chest pain or shortness of breath  Echocardiogram with ejection fraction 50%, moderate to severe aortic regurgitation, mild to moderate mitral regurgitation, tricuspid regurgitation. Cardiology on consult. Medical management for now.     Mechanical fall  Likely due to medication side effect  Avoid sedating medications  PT OT -recommending skilled nursing placement    Acute metabolic encephalopathy: Likely from urinary tract infection. Start Rocephin. Await urine culture.     Other chronic medical conditions, medication resumed unless contraindicated  Essential hypertension  CAD status post stent  Hyperlipidemia  GERD  [place]  DVT on Eliquis    Awaiting placement   Diet DIET LOW SODIUM 2 GM;   DVT Prophylaxis [] Lovenox, []  Heparin, [] SCDs, [] Warfarin  [x] NOAC     GI Prophylaxis [x] PPI,  [] H2 Blocker,  [] Carafate,  [] Diet/Tube Feeds   Code Status Full Code   MDM [] Low, [x] Moderate,[]  High     History of Present Illness:     Chief Complaint: Elevated troponin    Seen and examined today. Notes lower abdominal pain, mild. No dysuria. No fever or chills. Ten point ROS reviewed negative, unless as noted above    Objective:        Intake/Output Summary (Last 24 hours) at 3/13/2021 1411  Last data filed at 3/13/2021 0151  Gross per 24 hour   Intake --   Output 800 ml   Net -800 ml      Vitals:   Vitals:    21 1345   BP: (!) 188/77   Pulse:    Resp:    Temp:    SpO2:      Physical Exam:   GEN Awake female, sitting upright in bed in no apparent distress. Appears given age. EYES Pupils are equally round. No scleral erythema, discharge, or conjunctivitis. HENT Mucous membranes are moist. Oral pharynx without exudates, no evidence of thrush. NECK Supple, no apparent thyromegaly or masses. RESP Clear to auscultation, no wheezes, rales or rhonchi. Symmetric chest movement while on room air. CARDIO/VASC S1/S2 auscultated. Regular rate without appreciable murmurs, rubs, or gallops. No JVD or carotid bruits. Peripheral pulses equal bilaterally and palpable. No peripheral edema. GI Abdomen is soft without significant tenderness, masses, or guarding. Bowel sounds are normoactive. Rectal exam deferred.      Medications:   Medications:    cefTRIAXone (ROCEPHIN) IV  1,000 mg Intravenous Q24H    amLODIPine  5 mg Oral Once    aspirin  81 mg Oral Daily    atorvastatin  40 mg Oral Nightly    calcium elemental  500 mg Oral BID    docusate sodium  100 mg Oral Daily    apixaban  5 mg Oral BID    ferrous sulfate  325 mg Oral Daily with breakfast    furosemide  20 mg Oral Daily    lisinopril  2.5 mg Oral Daily    metoprolol tartrate  50 mg Oral BID    pantoprazole  40 mg Oral Daily    vitamin B-12  500 mcg Oral Daily    [START ON 3/15/2021] vitamin D  50,000 Units Oral Q30 Days    sodium chloride flush  10 mL Intravenous BID      Infusions:   PRN Meds: sodium chloride, 1 spray, Q2H PRN  aluminum & magnesium hydroxide-simethicone, 30 mL, Q6H PRN  hydrocortisone, 25 mg, BID PRN  ipratropium-albuterol, 1 ampule, Q4H PRN  sodium chloride flush, 10 mL, PRN  promethazine, 12.5 mg, Q6H PRN    Or  ondansetron, 4 mg, Q6H PRN  polyethylene glycol, 17 g, Daily PRN  acetaminophen, 650 mg, Q6H PRN    Or  acetaminophen, 650 mg, Q6H PRN        Recent Labs     03/11/21 1917 03/12/21  0508   WBC 7.4 7.6   HGB 12.6 13.0   HCT 39.6 41.5    198      Recent Labs     03/11/21 1917 03/12/21  0508    138   K 3.7 3.8    105   CO2 22 23 BUN 15 11   CREATININE 0.7 0.7     Recent Labs     03/11/21 1917   AST 15   ALT 10   BILITOT 0.4   ALKPHOS 62     No results for input(s): INR in the last 72 hours.   Recent Labs     03/11/21  1917 03/12/21  0508 03/12/21  0953   TROPONINT 0.050* 0.045* 0.049*        Imaging reviewed      Electronically signed by Christi Denny MD on 3/13/2021 at 2:11 PM

## 2021-03-14 PROBLEM — R07.9 CHEST PAIN: Status: ACTIVE | Noted: 2021-03-14

## 2021-03-14 PROCEDURE — 6370000000 HC RX 637 (ALT 250 FOR IP): Performed by: INTERNAL MEDICINE

## 2021-03-14 PROCEDURE — 99232 SBSQ HOSP IP/OBS MODERATE 35: CPT | Performed by: INTERNAL MEDICINE

## 2021-03-14 PROCEDURE — 2580000003 HC RX 258: Performed by: INTERNAL MEDICINE

## 2021-03-14 PROCEDURE — 96376 TX/PRO/DX INJ SAME DRUG ADON: CPT

## 2021-03-14 PROCEDURE — 94761 N-INVAS EAR/PLS OXIMETRY MLT: CPT

## 2021-03-14 PROCEDURE — 6360000002 HC RX W HCPCS: Performed by: INTERNAL MEDICINE

## 2021-03-14 PROCEDURE — 1200000000 HC SEMI PRIVATE

## 2021-03-14 PROCEDURE — APPSS45 APP SPLIT SHARED TIME 31-45 MINUTES: Performed by: NURSE PRACTITIONER

## 2021-03-14 RX ORDER — PHENAZOPYRIDINE HYDROCHLORIDE 100 MG/1
200 TABLET, FILM COATED ORAL
Status: DISCONTINUED | OUTPATIENT
Start: 2021-03-14 | End: 2021-03-15 | Stop reason: HOSPADM

## 2021-03-14 RX ORDER — FLUTICASONE PROPIONATE 50 MCG
1 SPRAY, SUSPENSION (ML) NASAL DAILY
Status: DISCONTINUED | OUTPATIENT
Start: 2021-03-14 | End: 2021-03-15 | Stop reason: HOSPADM

## 2021-03-14 RX ORDER — LISINOPRIL 20 MG/1
20 TABLET ORAL DAILY
Status: DISCONTINUED | OUTPATIENT
Start: 2021-03-14 | End: 2021-03-15

## 2021-03-14 RX ADMIN — CEFTRIAXONE 1000 MG: 1 INJECTION, POWDER, FOR SOLUTION INTRAMUSCULAR; INTRAVENOUS at 09:59

## 2021-03-14 RX ADMIN — ACETAMINOPHEN 650 MG: 325 TABLET ORAL at 20:56

## 2021-03-14 RX ADMIN — ACETAMINOPHEN 650 MG: 325 TABLET ORAL at 11:49

## 2021-03-14 RX ADMIN — LISINOPRIL 20 MG: 20 TABLET ORAL at 09:51

## 2021-03-14 RX ADMIN — ATORVASTATIN CALCIUM 40 MG: 40 TABLET, FILM COATED ORAL at 20:52

## 2021-03-14 RX ADMIN — SODIUM CHLORIDE, PRESERVATIVE FREE 10 ML: 5 INJECTION INTRAVENOUS at 07:53

## 2021-03-14 RX ADMIN — METOPROLOL TARTRATE 50 MG: 50 TABLET, FILM COATED ORAL at 20:52

## 2021-03-14 RX ADMIN — APIXABAN 5 MG: 5 TABLET, FILM COATED ORAL at 20:52

## 2021-03-14 RX ADMIN — FUROSEMIDE 20 MG: 20 TABLET ORAL at 09:50

## 2021-03-14 RX ADMIN — Medication 500 MG: at 09:51

## 2021-03-14 RX ADMIN — PHENAZOPYRIDINE HYDROCHLORIDE 200 MG: 100 TABLET ORAL at 16:32

## 2021-03-14 RX ADMIN — METOPROLOL TARTRATE 50 MG: 50 TABLET, FILM COATED ORAL at 09:51

## 2021-03-14 RX ADMIN — CETIRIZINE HYDROCHLORIDE 10 MG: 10 TABLET, FILM COATED ORAL at 07:53

## 2021-03-14 RX ADMIN — APIXABAN 5 MG: 5 TABLET, FILM COATED ORAL at 09:50

## 2021-03-14 RX ADMIN — Medication 500 MG: at 20:52

## 2021-03-14 RX ADMIN — SODIUM CHLORIDE, PRESERVATIVE FREE 10 ML: 5 INJECTION INTRAVENOUS at 21:12

## 2021-03-14 RX ADMIN — FLUTICASONE PROPIONATE 1 SPRAY: 50 SPRAY, METERED NASAL at 09:50

## 2021-03-14 RX ADMIN — PHENAZOPYRIDINE HYDROCHLORIDE 200 MG: 100 TABLET ORAL at 11:49

## 2021-03-14 RX ADMIN — PANTOPRAZOLE SODIUM 40 MG: 40 TABLET, DELAYED RELEASE ORAL at 09:51

## 2021-03-14 ASSESSMENT — PAIN SCALES - GENERAL
PAINLEVEL_OUTOF10: 0
PAINLEVEL_OUTOF10: 0
PAINLEVEL_OUTOF10: 3

## 2021-03-14 ASSESSMENT — PAIN DESCRIPTION - LOCATION: LOCATION: GENERALIZED

## 2021-03-14 ASSESSMENT — PAIN DESCRIPTION - DESCRIPTORS: DESCRIPTORS: ACHING

## 2021-03-14 ASSESSMENT — PAIN DESCRIPTION - PAIN TYPE
TYPE: CHRONIC PAIN
TYPE: ACUTE PAIN

## 2021-03-14 NOTE — PROGRESS NOTES
Cardiology Progress Note     Today's Plan cpm  Admit Date:  3/11/2021    Consult reason/ Seen today for: elevated troponin    Subjective and  Overnight Events: denies any chest pain or shortness of breath  discussed abnormal cardiac markers and offered stress test-she is not sure is she wants to pursue testing at this time    Telemetry SR with PAC-  [facility]    Assessment / Plan / Recommendation:     1. Elevated troponin in the setting of CAD--has known history of CAD with PCI of LAD in the remote past-medical management at this time based on patient's age and other comorbidities - continue with statin, aspirin, beta-blocker- offered stress test-she is not wanting testing at this time- further non invasive testing can be done as outpatient   2. Valvular heart disease: echocardiogram with moderate to severe AR moderate, moderate moderate MR and TR-conservative management at this time- continue with low dose ACE-in the setting of severe AR it is reasonable to maintain blood pressure > 130/80. Will follow as outpatient   3. Hypertension-stable-continue with low-dose lisinopril metoprolol. Holding parameters in place  4. Dyslipidemia-continue with statin  5. Fall-mechanical      History of Presenting Illness:    Chief complain on admission : 80 y. o.year old who is admitted for  Chief Complaint   Patient presents with   Ullin Bars Fall        Past medical history:    has a past medical history of Allergic rhinitis, Angina pectoris ([address] Aortic regurgitation, Arthritis, CAD (coronary artery disease), CHF (congestive heart failure) ([address] Chronic back pain, Dyslipidemia, GERD (gastroesophageal reflux disease), H/O cardiovascular stress test, H/O cardiovascular stress test, H/O CT scan of abdomen, H/O Doppler ultrasound, H/O Doppler ultrasound, H/O Doppler ultrasound, H/O Doppler ultrasound, H/O echocardiogram, H/O echocardiogram, H/O echocardiogram, History of PTCA, [place] (hard of hearing), Hx of cardiovascular stress test, Hx of Doppler ultrasound, Hyperlipidemia, Hypertension, Iron deficiency anemia, Left nephrolithiasis, MI, old, Migraine, Mitral regurgitation, Muscle weakness (generalized), Orthostatic hypotension, Osteoarthritis, Osteoporosis, Renal disease, Restless legs syndrome, and S/P pericardiocentesis. Past surgical history:   has a past surgical history that includes Hysterectomy; Pericardium surgery (9/2006); Cataract removal; and Coronary angioplasty with stent (2006). Social History:   reports that she has never smoked. She has never used smokeless tobacco. She reports that she does not drink alcohol or use drugs. Family history:  family history is not on file. Allergies   Allergen Reactions    Ibuprofen Itching    Antivert [Meclizine Hcl]     Bactrim [Sulfamethoxazole-Trimethoprim]     Codeine     Erythromycin     Macrobid [Nitrofurantoin Macrocrystal]     Meclizine     Niacin And Related     Seldane [Terfenadine]     Sulfa Antibiotics     Tramadol Other (See Comments)    Ultram [Tramadol Hcl]     Aspirin Other (See Comments)     Pt cannot have ASA in high doses.         Review of Systems:   All 14 systems were reviewed and are negative  Except for the positive findings which are documented     BP (!) 148/71   Pulse 92   Temp 98.6 °F (37 °C) (Oral)   Resp 16   Ht 4' 11\" (1.499 m)   Wt 151 lb (68.5 kg)   SpO2 92%   BMI 30.50 kg/m²     No intake or output data in the 24 hours ending 03/14/21 0808    Physical Exam:  Constitutional:   No acute distress  HENT:  Normocephalic, Atraumatic, Bilateral external ears normal,  Nose normal.   Neck- trachea is midline  Eyes:  PEERL, Conjunctiva normal  Respiratory:  Normal breath sounds, No respiratory distress  Cardiovascular:  Normal heart rate, Normal rhythm, + murmur appreciated, No rubs appreciated, No gallops appreciated, JVP not elevated  Abdomen/GI:  Soft, No tenderness  Musculoskeletal:  Intact distal pulses, No edema to lower legs,  No tenderness, No cyanosis, No clubbing. Integument:  Warm, Dry  Lymphatic:  No lymphadenopathy noted. Neurologic:  Alert & oriented  Psychiatric:  Affect and Mood :pleasant     Medications:    lisinopril  20 mg Oral Daily    cefTRIAXone (ROCEPHIN) IV  1,000 mg Intravenous Q24H    cetirizine  10 mg Oral Daily    aspirin  81 mg Oral Daily    atorvastatin  40 mg Oral Nightly    calcium elemental  500 mg Oral BID    docusate sodium  100 mg Oral Daily    apixaban  5 mg Oral BID    ferrous sulfate  325 mg Oral Daily with breakfast    furosemide  20 mg Oral Daily    metoprolol tartrate  50 mg Oral BID    pantoprazole  40 mg Oral Daily    vitamin B-12  500 mcg Oral Daily    [START ON 3/15/2021] vitamin D  50,000 Units Oral Q30 Days    sodium chloride flush  10 mL Intravenous BID       hydrALAZINE (APRESOLINE) ivpb, sodium chloride, aluminum & magnesium hydroxide-simethicone, hydrocortisone, ipratropium-albuterol, sodium chloride flush, promethazine **OR** ondansetron, polyethylene glycol, acetaminophen **OR** acetaminophen    Lab Data:  CBC:   Recent Labs     03/11/21 1917 03/12/21  0508   WBC 7.4 7.6   HGB 12.6 13.0   HCT 39.6 41.5   MCV 93.2 92.2    198     BMP:   Recent Labs     03/11/21 1917 03/12/21  0508    138   K 3.7 3.8    105   CO2 22 23   BUN 15 11   CREATININE 0.7 0.7     PT/INR: No results for input(s): PROTIME, INR in the last 72 hours. BNP:  No results for input(s): PROBNP in the last 72 hours. TROPONIN:   Recent Labs     03/11/21 1917 03/12/21  0508 03/12/21  0953   TROPONINT 0.050* 0.045* 0.049*              Impression:  Principal Problem:    Elevated troponin  Resolved Problems:    * No resolved hospital problems.  *       All labs, medications and tests reviewed by myself, continue all other medications of all above medical condition listed as is except for changes mentioned above.    Thank you   Please call with questions. Electronically signed by MERRICK Washington CNP on 3/14/2021 at 8:08 AM   I have seen ,spoken to  and examined this patient personally, independently of the nurse practitioner. I have reviewed the hospital care given to date and reviewed all pertinent labs and imaging. The plan was developed mutually at the time of the visit with the patient,  NP  and myself. I have spoken with patient, nursing staff and provided written and verbal instructions . The above note has been reviewed and I agree with the assessment, diagnosis, and treatment plan with changes made by me as follows     CARDIOLOGY ATTENDING ADDENDUM    HPI:  I have reviewed the above HPI  And agree with above   Danelle Claros is a 80 y. o.year old who and presents with had concerns including Fall. Chief Complaint   Patient presents with    Fall     Interval history:  No CP    Physical Exam:  General:  Awake, alert, NAD  Head:normal  Eye:normal  Neck:  No JVD   Chest:  Clear to auscultation, respiration easy  Cardiovascular:  RRR S1S2  Abdomen:   nontender  Extremities:  no edema  Pulses; palpable  Neuro: grossly normal      MEDICAL DECISION MAKING;    I agree with the above plan, which was planned by myself and discussed with NP.   Declines stress  Conservative treatment        Kena Del Cid MD [facility]

## 2021-03-14 NOTE — PROGRESS NOTES
Hospitalist Progress Note      Name:  Catarina Paula /Age/Sex: 3/7/1930  (80 y.o. female)   MRN & CSN:  [id number] & [id number] Admission Date/Time: 3/11/2021  4:31 PM   Location:  [facility]/1122- PCP: [facility] Day: 4    Assessment and Plan:   Catarina Paula is a 80 y.o.  female  who presents with Elevated troponin    Elevated troponin  EKG: Normal sinus rhythm with T WI in leads V4 through V6  Troponin stable, trending down. Patient denied any chest pain or shortness of breath  Echocardiogram with ejection fraction 50%, moderate to severe aortic regurgitation, mild to moderate mitral regurgitation, tricuspid regurgitation. Cardiology on consult. Medical management for now.     Mechanical fall  Likely due to medication side effect  Avoid sedating medications  PT OT -recommending skilled nursing placement    Acute metabolic encephalopathy: Likely from urinary tract infection. Start Rocephin. Await urine culture.     Other chronic medical conditions, medication resumed unless contraindicated  Essential hypertension  CAD status post stent  Hyperlipidemia  GERD  [place]  DVT on Eliquis    Awaiting placement   Diet DIET LOW SODIUM 2 GM;   DVT Prophylaxis [] Lovenox, []  Heparin, [] SCDs, [] Warfarin  [x] NOAC     GI Prophylaxis [x] PPI,  [] H2 Blocker,  [] Carafate,  [] Diet/Tube Feeds   Code Status Full Code   MDM [] Low, [x] Moderate,[]  High     History of Present Illness:     Chief Complaint: Elevated troponin    Seen and examined today. Complaining of dysuria. Also with nasal congestion. No fever or chills. Ten point ROS reviewed negative, unless as noted above    Objective:     No intake or output data in the 24 hours ending 21 1124   Vitals:   Vitals:    21 0844   BP:    Pulse:    Resp:    Temp:    SpO2: 92%     Physical Exam:   GEN Awake female, sitting upright in bed in no apparent distress. Appears given age. EYES Pupils are equally round.   No scleral erythema, discharge, or conjunctivitis. HENT Mucous membranes are moist. Oral pharynx without exudates, no evidence of thrush. NECK Supple, no apparent thyromegaly or masses. RESP Clear to auscultation, no wheezes, rales or rhonchi. Symmetric chest movement while on room air. CARDIO/VASC S1/S2 auscultated. Regular rate without appreciable murmurs, rubs, or gallops. No JVD or carotid bruits. Peripheral pulses equal bilaterally and palpable. No peripheral edema. GI Abdomen is soft without significant tenderness, masses, or guarding. Bowel sounds are normoactive. Rectal exam deferred.      Medications:   Medications:    lisinopril  20 mg Oral Daily    phenazopyridine  200 mg Oral TID WC    fluticasone  1 spray Each Nostril Daily    cefTRIAXone (ROCEPHIN) IV  1,000 mg Intravenous Q24H    cetirizine  10 mg Oral Daily    aspirin  81 mg Oral Daily    atorvastatin  40 mg Oral Nightly    calcium elemental  500 mg Oral BID    docusate sodium  100 mg Oral Daily    apixaban  5 mg Oral BID    ferrous sulfate  325 mg Oral Daily with breakfast    furosemide  20 mg Oral Daily    metoprolol tartrate  50 mg Oral BID    pantoprazole  40 mg Oral Daily    vitamin B-12  500 mcg Oral Daily    [START ON 3/15/2021] vitamin D  50,000 Units Oral Q30 Days    sodium chloride flush  10 mL Intravenous BID      Infusions:   PRN Meds: hydrALAZINE (APRESOLINE) ivpb, 10 mg, Q4H PRN  sodium chloride, 1 spray, Q2H PRN  aluminum & magnesium hydroxide-simethicone, 30 mL, Q6H PRN  hydrocortisone, 25 mg, BID PRN  ipratropium-albuterol, 1 ampule, Q4H PRN  sodium chloride flush, 10 mL, PRN  promethazine, 12.5 mg, Q6H PRN    Or  ondansetron, 4 mg, Q6H PRN  polyethylene glycol, 17 g, Daily PRN  acetaminophen, 650 mg, Q6H PRN    Or  acetaminophen, 650 mg, Q6H PRN      Recent Labs     03/11/21 1917 03/12/21  0508   WBC 7.4 7.6   HGB 12.6 13.0   HCT 39.6 41.5    198      Recent Labs     03/11/21 1917 03/12/21  0508    138   K 3.7 3.8    105   CO2 22 23   BUN 15 11   CREATININE 0.7 0.7     Recent Labs     03/11/21 1917   AST 15   ALT 10   BILITOT 0.4   ALKPHOS 62     No results for input(s): INR in the last 72 hours.   Recent Labs     03/11/21 1917 03/12/21  0508 03/12/21  0953   TROPONINT 0.050* 0.045* 0.049*      Imaging reviewed    Electronically signed by Antoinette Michel MD on 3/14/2021 at 11:24 AM

## 2021-03-14 NOTE — PROGRESS NOTES
Received a call from Maite Harrington, patients daughter, during shift change. Acknowledged that she knew it was shift change and remained on the line for as long time. Demanded that her mother be forced to take her medications and that anything else is neglect. \"If you can not get her to take her medications, get another nurse who can. \" Desire Hopkins, the day nurse, reported having the same conversation with Maite Harrington. Informed Maite Harrington that the day nurse got her to take the medications by telling her Maite Harrington said she has to take them. Maite Harrington was not happy about that. \"You shouldn't have to tell her Maite Harrington said you must take them or that anyone said she must take them. \"    I informed Maite Harrington that we can not force medications on someone who refuses them. That is considered assault. We do have IV blood pressure medication if needed. She reinforced that it is not assault it's neglect to not force her to take her medications. Patient woke up about 9:30 PM. Assessment is completed. Patient appears to be alert and processing information at a level that appears that she knows what she is doing and saying. Patient is demanding, refusing to move self and repeats water over and over until she is given a glass of water. She is able to move arms and hold the cup that is on her bedside table. Table and water are within reach. She was able to place the glass down on the table after she was finished drinking. She complained of being cold. Temperature turned up in room and warm blanket applied after she was boosted up in bed. She then immediately protested that she had to many blankets on her so I took one off. She then asked that her feet by uncovered. I uncovered her feet. She continued to insist that her feet be uncovered. Patient refused medications despite education and that Maite Harrington wants her to take her medications. Explained what each medication was. Talked over me at this time and every time I spoke.  Advised patient that she knows what she is doing and that behavior is inappropriate. Spoke with Bailey Alvarez, charge nurse, about the situation. Document and she will inform the day nurse tomorrow of events. It appears that both the patient and the patients daughter have poor boundaries and unrealistic expectations.

## 2021-03-14 NOTE — PLAN OF CARE
of physical injury  3/14/2021 0748 by Obed Martell RN  Outcome: Ongoing  3/13/2021 2304 by Karlee Burnett RN  Outcome: Ongoing     Problem: Mood - Altered:  Goal: Mood stable  Description: Mood stable  3/14/2021 0748 by Obed Martell RN  Outcome: Ongoing  3/13/2021 2304 by Karlee Burnett RN  Outcome: Ongoing  Goal: Absence of abusive behavior  Description: Absence of abusive behavior  3/14/2021 0748 by Obed Martell RN  Outcome: Ongoing  3/13/2021 2304 by Karlee Burnett RN  Outcome: Ongoing  Goal: Verbalizations of feeling emotionally comfortable while being cared for will increase  Description: Verbalizations of feeling emotionally comfortable while being cared for will increase  3/14/2021 0748 by Obed Martell RN  Outcome: Ongoing  3/13/2021 2304 by Karlee Burnett RN  Outcome: Ongoing     Problem: Psychomotor Activity - Altered:  Goal: Absence of psychomotor disturbance signs and symptoms  Description: Absence of psychomotor disturbance signs and symptoms  3/14/2021 0748 by Obed Martell RN  Outcome: Ongoing  3/13/2021 2304 by Karlee Burnett RN  Outcome: Ongoing     Problem: Sensory Perception - Impaired:  Goal: Demonstrations of improved sensory functioning will increase  Description: Demonstrations of improved sensory functioning will increase  3/14/2021 0748 by Obed Martell RN  Outcome: Ongoing  3/13/2021 2304 by Karlee Burnett RN  Outcome: Met This Shift  Goal: Decrease in sensory misperception frequency  Description: Decrease in sensory misperception frequency  3/14/2021 0748 by Obed Martell RN  Outcome: Ongoing  3/13/2021 2304 by Karlee Burnett RN  Outcome: Met This Shift  Goal: Able to refrain from responding to false sensory perceptions  Description: Able to refrain from responding to false sensory perceptions  3/14/2021 0748 by Obed Martell RN  Outcome: Ongoing  3/13/2021 2304 by Karlee Burnett RN  Outcome: Met This Shift  Goal: Demonstrates accurate environmental perceptions  Description: Demonstrates accurate environmental perceptions  3/14/2021 0748 by Robin Kenny RN  Outcome: Ongoing  3/13/2021 2304 by Zohra Gregory RN  Outcome: Met This Shift  Goal: Able to distinguish between reality-based and nonreality-based thinking  Description: Able to distinguish between reality-based and nonreality-based thinking  3/14/2021 0748 by Robin Kenny RN  Outcome: Ongoing  3/13/2021 2304 by Zohra Gregory RN  Outcome: Met This Shift  Goal: Able to interrupt nonreality-based thinking  Description: Able to interrupt nonreality-based thinking  3/14/2021 0748 by Robin Kenny RN  Outcome: Ongoing  3/13/2021 2304 by Zohra Gregory RN  Outcome: Ongoing     Problem: Sleep Pattern Disturbance:  Goal: Appears well-rested  Description: Appears well-rested  3/14/2021 0748 by Robin Kenny RN  Outcome: Ongoing  3/13/2021 2304 by Zohra Gregory RN  Outcome: Met This Shift     Problem: Pain:  Goal: Pain level will decrease  Description: Pain level will decrease  3/14/2021 0748 by Robin Kenny RN  Outcome: Ongoing  3/13/2021 2304 by Zohra Gregory RN  Outcome: Ongoing  Goal: Control of acute pain  Description: Control of acute pain  Outcome: Ongoing  Goal: Control of chronic pain  Description: Control of chronic pain  3/14/2021 0748 by Robin Kenny RN  Outcome: Ongoing  3/13/2021 2304 by Zohra Gregory RN  Outcome: Ongoing

## 2021-03-15 VITALS
TEMPERATURE: 97.7 F | WEIGHT: 151 LBS | HEART RATE: 60 BPM | RESPIRATION RATE: 18 BRPM | SYSTOLIC BLOOD PRESSURE: 139 MMHG | BODY MASS INDEX: 30.44 KG/M2 | DIASTOLIC BLOOD PRESSURE: 65 MMHG | HEIGHT: 59 IN | OXYGEN SATURATION: 92 %

## 2021-03-15 LAB
CULTURE: ABNORMAL
CULTURE: ABNORMAL
Lab: ABNORMAL
SARS-COV-2, NAAT: NOT DETECTED
SPECIMEN: ABNORMAL

## 2021-03-15 PROCEDURE — 6370000000 HC RX 637 (ALT 250 FOR IP): Performed by: INTERNAL MEDICINE

## 2021-03-15 PROCEDURE — 6360000002 HC RX W HCPCS: Performed by: INTERNAL MEDICINE

## 2021-03-15 PROCEDURE — 2580000003 HC RX 258: Performed by: INTERNAL MEDICINE

## 2021-03-15 PROCEDURE — 94761 N-INVAS EAR/PLS OXIMETRY MLT: CPT

## 2021-03-15 PROCEDURE — 99232 SBSQ HOSP IP/OBS MODERATE 35: CPT | Performed by: INTERNAL MEDICINE

## 2021-03-15 PROCEDURE — 97530 THERAPEUTIC ACTIVITIES: CPT

## 2021-03-15 PROCEDURE — APPSS45 APP SPLIT SHARED TIME 31-45 MINUTES: Performed by: NURSE PRACTITIONER

## 2021-03-15 PROCEDURE — 87635 SARS-COV-2 COVID-19 AMP PRB: CPT

## 2021-03-15 RX ORDER — FLUTICASONE PROPIONATE 50 MCG
1 SPRAY, SUSPENSION (ML) NASAL DAILY
Qty: 1 BOTTLE | Refills: 3 | Status: SHIPPED | OUTPATIENT
Start: 2021-03-16

## 2021-03-15 RX ORDER — LISINOPRIL 10 MG/1
10 TABLET ORAL DAILY
Status: DISCONTINUED | OUTPATIENT
Start: 2021-03-15 | End: 2021-03-15 | Stop reason: HOSPADM

## 2021-03-15 RX ORDER — PHENAZOPYRIDINE HYDROCHLORIDE 200 MG/1
200 TABLET, FILM COATED ORAL
Qty: 9 TABLET | Refills: 0 | Status: SHIPPED | OUTPATIENT
Start: 2021-03-15 | End: 2021-03-18

## 2021-03-15 RX ORDER — AMOXICILLIN AND CLAVULANATE POTASSIUM 875; 125 MG/1; MG/1
1 TABLET, FILM COATED ORAL EVERY 12 HOURS SCHEDULED
Status: DISCONTINUED | OUTPATIENT
Start: 2021-03-15 | End: 2021-03-15 | Stop reason: HOSPADM

## 2021-03-15 RX ORDER — AMOXICILLIN AND CLAVULANATE POTASSIUM 875; 125 MG/1; MG/1
1 TABLET, FILM COATED ORAL EVERY 12 HOURS SCHEDULED
Qty: 10 TABLET | Refills: 0 | Status: SHIPPED | OUTPATIENT
Start: 2021-03-15 | End: 2021-03-20

## 2021-03-15 RX ORDER — LISINOPRIL 10 MG/1
10 TABLET ORAL DAILY
Qty: 30 TABLET | Refills: 3 | Status: SHIPPED | OUTPATIENT
Start: 2021-03-15

## 2021-03-15 RX ADMIN — FUROSEMIDE 20 MG: 20 TABLET ORAL at 11:43

## 2021-03-15 RX ADMIN — PHENAZOPYRIDINE HYDROCHLORIDE 200 MG: 100 TABLET ORAL at 11:43

## 2021-03-15 RX ADMIN — HYDRALAZINE HYDROCHLORIDE 10 MG: 20 INJECTION, SOLUTION INTRAMUSCULAR; INTRAVENOUS at 10:47

## 2021-03-15 RX ADMIN — APIXABAN 5 MG: 5 TABLET, FILM COATED ORAL at 11:43

## 2021-03-15 RX ADMIN — CETIRIZINE HYDROCHLORIDE 10 MG: 10 TABLET, FILM COATED ORAL at 08:11

## 2021-03-15 RX ADMIN — LISINOPRIL 20 MG: 20 TABLET ORAL at 11:43

## 2021-03-15 RX ADMIN — Medication 81 MG: at 11:43

## 2021-03-15 RX ADMIN — SALINE NASAL SPRAY 1 SPRAY: 1.5 SOLUTION NASAL at 14:30

## 2021-03-15 RX ADMIN — SODIUM CHLORIDE, PRESERVATIVE FREE 10 ML: 5 INJECTION INTRAVENOUS at 08:12

## 2021-03-15 RX ADMIN — METOPROLOL TARTRATE 50 MG: 50 TABLET, FILM COATED ORAL at 11:43

## 2021-03-15 RX ADMIN — CEFTRIAXONE 1000 MG: 1 INJECTION, POWDER, FOR SOLUTION INTRAMUSCULAR; INTRAVENOUS at 08:12

## 2021-03-15 ASSESSMENT — PAIN SCALES - GENERAL
PAINLEVEL_OUTOF10: 0

## 2021-03-15 NOTE — PROGRESS NOTES
Pt refused her medications this am. Dr James Barriga is aware. Pt asked again and stated she will take her medications when she gets her dinner. I will continue to monitor.

## 2021-03-15 NOTE — PROGRESS NOTES
Physical Therapy    Physical Therapy Treatment Note  Name: Claudia Tanner MRN: [id number] :   3/7/1930   Date:  3/15/2021   Admission Date: 3/11/2021 Room:  [address] [place] [address]     Restrictions/Precautions:        general precautions, falls, chair alarm    Communication with other providers:  RN approves tx session. Subjective:  Patient states:  Agreeable to tx session; \"I will be much better once someone gets me back to that bed! \"    Pain:   Location, Type, Intensity (0/10 to 10/10): Does not rate    Objective:    Observation:  Pt in chair upon arrival with call light on and calling for help; \"Someone help me get back in this bed! \". Treatment, including education/measures:  Transfers  Sit to supine :Sarah for LE negotiation   Scooting :SBA with bed features and increased time/effort  Rolling :SBA  Sit to stand :modA from chair  Stand to sit :Sarah to bed; VC's for eccentric control  SPT:Sarah; able to take ~4 small steps to EOB from chair. Pt refusing further tx stating \"I just need to be in this bed for awhile you all left me in the chair too long! \"    Safety  Patient left safely in the bed, with call light/phone in reach with alarm applied. Gait belt used for transfers.     Assessment / Impression:       Patient's tolerance of treatment:  fair   Adverse Reaction: none  Significant change in status and impact:  none  Barriers to improvement:  Safety, strength     Plan for Next Session:    Will cont to work towards pt's goals per her tolerance    Time in:  1456  Time out:  1506  Timed treatment minutes:  10  Total treatment time:  10    Previously filed items:  Social/Functional History  Lives With: Alone  Type of Home: Assisted living([facility])  Home Layout: One level  Home Access: Level entry  Bathroom Shower/Tub: Tub/Shower unit  Bathroom Toilet: Handicap height  Bathroom Equipment: Shower chair, Grab bars in shower  Bathroom Accessibility: Accessible  Home Equipment: Rolling walker, Wheelchair-manual  ADL Assistance: Independent  Homemaking Assistance: Needs assistance(Daughter does laundry, pt goes to [employer] for meals)  Homemaking Responsibilities: No  Ambulation Assistance: Independent(uses RW short distances in apartment, uses manual wheelchair for longer distances)  Transfer Assistance: Independent  Active : No  Occupation: Retired  Additional Comments: questionable historian, should confirm the above information with facility  Short term goals  Time Frame for Short term goals: 1 week  Short term goal 1: Pt will perform sit><supine Sarah  Short term goal 2: Pt will transition sit><stand CGA  Short term goal 3: Pt will transfer between surfaces CGA with RW  Short term goal 4: Pt will ambulate 30ft with RW CGA  Short term goal 5: Pt will propel WC 100ft SBA       Electronically signed by:    Sylvia Araiza PTA  3/15/2021, 3:13 PM

## 2021-03-15 NOTE — DISCHARGE SUMMARY
Discharge Summary    Name:  Lazaro Shah /Age/Sex: 3/7/1930  (80 y.o. female)   MRN & CSN:  [id number] & [id number] Admission Date/Time: 3/11/2021  4:31 PM   Attending:  Calos Marroquin MD Discharging Physician: Calos Marroquin MD     Hospital Course:   Lazaro Shah is a 80 y.o.  female  who presents with Elevated troponin    Elevated troponin  EKG: Normal sinus rhythm with T WI in leads V4 through V6  Troponin stable, trended down. Patient denied any chest pain or shortness of breath  Echocardiogram with ejection fraction 50%, moderate to severe aortic regurgitation, mild to moderate mitral regurgitation, tricuspid regurgitation. Cardiology on consult. Medical management for now.     Mechanical fall  Likely due to medication side effect  Avoid sedating medications  PT OT -recommending skilled nursing placement     Acute metabolic encephalopathy: Likely from urinary tract infection. Started on Rocephin. Urine culture with E. Coli. DC on Augmentin x 5 days     Essential hypertension: Uncontrolled this morning. BP better on discharge     Other chronic medical conditions, medication resumed unless contraindicated     CAD status post stent  Hyperlipidemia  GERD  [place]  DVT on Eliquis    The patient expressed appropriate understanding of and agreement with the discharge recommendations, medications, and plan.      Consults this admission:  IP CONSULT TO HOSPITALIST  IP CONSULT TO CARDIOLOGY  IP CONSULT TO HOSPITALIST    Discharge Instruction:   Follow up appointments: Cardiology  Primary care physician:  within 2 weeks    Diet:  cardiac diet   Activity: activity as tolerated  Disposition: Discharged to:   []Home, []C, [x]SNF, []Acute Rehab, []Hospice   Condition on discharge: Stable    Discharge Medications:      Monica Lang   Home Medication Instructions [facility]:[id number]    Printed on:03/15/21 5072   Medication Information                      acetaminophen (TYLENOL ARTHRITIS PAIN) 650 MG CR tablet  Take 650 mg by mouth every 6 hours as needed for Pain              aluminum & magnesium hydroxide-simethicone (MYLANTA) 400-400-40 MG/5ML SUSP  Take 30 mLs by mouth every 6 hours as needed             amoxicillin-clavulanate (AUGMENTIN) 875-125 MG per tablet  Take 1 tablet by mouth every 12 hours for 5 days             aspirin 81 MG chewable tablet  Take 81 mg by mouth once a week              atorvastatin (LIPITOR) 40 MG tablet  Take 40 mg by mouth nightly             calcium carbonate 600 MG TABS tablet  Take 1 tablet by mouth 2 times daily              docusate sodium (COLACE) 100 MG capsule  Take 100 mg by mouth Daily             ELIQUIS 5 MG TABS tablet  1 tablet 2 times daily             ferrous sulfate 325 (65 Fe) MG tablet  Take 325 mg by mouth daily (with breakfast)             fluticasone (FLONASE) 50 MCG/ACT nasal spray  1 spray by Each Nostril route daily             furosemide (LASIX) 20 MG tablet  Take 20 mg by mouth daily             guaiFENesin (ROBITUSSIN MUCUS+CHEST CONGEST) 100 MG/5ML liquid  Take 200 mg by mouth 4 times daily as needed for Cough             Humidifiers (COOL MIST HUMIDIFIER 0.8 GAL) MISC  1 Dose by Does not apply route nightly             hydrocortisone (ANUSOL-HC) 25 MG suppository  Place 25 mg rectally 2 times daily as needed for Hemorrhoids             ipratropium-albuterol (DUONEB) 0.5-2.5 (3) MG/3ML SOLN nebulizer solution  as needed             lidocaine (LIDODERM) 5 %  Place 1 patch onto the skin daily 12 hours on, 12 hours off.             lisinopril (PRINIVIL;ZESTRIL) 10 MG tablet  Take 1 tablet by mouth daily             loratadine (CLARITIN) 10 MG tablet  Take 1 tablet by mouth daily             metoprolol tartrate (LOPRESSOR) 50 MG tablet  Take 1 tablet by mouth 2 times daily             Multiple Vitamins-Minerals (PRESERVISION AREDS 2 PO)  Take by mouth daily             neomycin-bacitracin-polymyxin (NEOSPORIN) 400-5-5000 ointment  Apply topically 2 times daily Apply topically 2 times daily. nystatin (MYCOSTATIN) 840365 UNIT/GM powder  Apply topically every 12 hours as needed (yeast)             pantoprazole (PROTONIX) 40 MG tablet  Take 1 tablet by mouth daily             phenazopyridine (PYRIDIUM) 200 MG tablet  Take 1 tablet by mouth 3 times daily (with meals) for 3 days             polyethyl glycol-propyl glycol 0.4-0.3 % (SYSTANE) 0.4-0.3 % ophthalmic solution  Place 2 drops into both eyes as needed for Dry Eyes             risedronate (ACTONEL) 35 MG tablet  Take 35 mg by mouth every 7 days             vitamin B-12 (CYANOCOBALAMIN) 500 MCG tablet  Take 500 mcg by mouth daily              vitamin D (CHOLECALCIFEROL) 62571 UNIT CAPS  Take 50,000 Units by mouth every 30 days                 Objective Findings at Discharge:   BP (!) 151/65   Pulse 61   Temp 98.8 °F (37.1 °C) (Oral)   Resp 18   Ht 4' 11\" (1.499 m)   Wt 151 lb (68.5 kg)   SpO2 93%   BMI 30.50 kg/m²            GEN    Awake female, sitting upright in bed in no apparent distress. Appears given age. Hard of hearing. Some confusion  EYES   Pupils are equally round. No scleral erythema, discharge, or conjunctivitis. HENT  Mucous membranes are moist. Oral pharynx without exudates, no evidence of thrush. NECK  Supple, no apparent thyromegaly or masses. RESP  Clear to auscultation, no wheezes, rales or rhonchi. Symmetric chest movement while on room air. CARDIO/VASC           S1/S2 auscultated. Regular rate without appreciable murmurs, rubs, or gallops. No JVD or carotid bruits. Peripheral pulses equal bilaterally and palpable. No peripheral edema. GI        Abdomen is soft without significant tenderness, masses, or guarding. Bowel sounds are normoactive. Rectal exam deferred. Krysten Bess BMP/CBC  No results for input(s): NA, K, CL, CO2, BUN, CREATININE, WBC, HEMOGLOBIN, HCT, PLT in the last 72 hours.     Invalid input(s): GLU    IMAGING:  Echocardiogram Complete 2d With Doppler With Color    Result Date: 3/12/2021  Transthoracic Echocardiography Report (TTE)  Demographics   Patient Name       Rosana Helms  Date of Study       03/12/2021                     MAXINE   Date of Birth      03/07/1930         Gender              Female   Age                80 year(s)         Race                   Patient Number     [id number]         Room Number         4864   Visit Number       [id number]   Corporate ID       [id number]   Accession Number   [id number]         Sonographer         Rosie Muir RVT, RDMS   Ordering Physician Chino Rios MD      Physician           Donna Chou MD  Procedure Type of Study   TTE procedure:ECHOCARDIOGRAM COMPLETE 2D W DOPPLER W COLOR. Procedure Date Date: 03/12/2021 Start: 08:15 AM Study Location: Portable Technical Quality: Adequate visualization Indications:NSTEMI. Patient Status: Routine Height: 59 inches Weight: 158 pounds BSA: 1.67 m2 BMI: 31.91 kg/m2 HR: 50 bpm BP: 205/73 mmHg  Conclusions   Summary  Patient refused exam; parasternal images were obtained. Left ventricular systolic function is low normal.  Ejection fraction is visually estimated at 50%. Moderate left ventricular hypertrophy. Calcified aortic valve. Moderate to severe aortic regurgitation noted with color Doppler; vena  contracta: 0.76 cm. Moderate mitral regurgitation. Moderate tricuspid regurgitation; RVSP: 38 mmHg consistent with mild PHTN. No evidence of any pericardial effusion.    Signature   ------------------------------------------------------------------  Electronically signed by Suzie Marrero MD  (Interpreting physician) on 03/12/2021 at 11:45 AM  ------------------------------------------------------------------   Findings   Left Ventricle  Left ventricular systolic function is low normal.  Ejection fraction is visually estimated at 50%. Moderate left ventricular hypertrophy. Aortic Valve  Calcified aortic valve. Moderate to severe aortic regurgitation noted with color Doppler; vena  contracta: 0.76 cm. Mitral Valve  Moderate mitral regurgitation. Tricuspid Valve  Moderate tricuspid regurgitation; RVSP: 38 mmHg consistent with mild PHTN. Pulmonic Valve  The pulmonic valve was not well visualized. Pericardial Effusion  No evidence of any pericardial effusion. Pleural Effusion  No evidence of pleural effusion. M-Mode/2D Measurements & Calculations   LV Diastolic Dimension:   LV Systolic Dimension:   LA Dimension: 4.5 cmAO  3.8 cm                    2.91 cm                  Root Dimension: 3.3 cm  LV FS:23.4 %              LV Volume Diastolic:  LV PW Diastolic: 4.57 cm  89.7 ml  LV PW Systolic: 2.76 cm   LV Volume Systolic: 49.2  Septum Diastolic: 6.30 cm ml  Septum Systolic: 3.30 cm  LV EDV/LV EDV Index:     LA/Aorta: 1.36                            54.9 ml/33 m2LV ESV/LV                            ESV Index: 24.6 ml/15 m2                            EF Calculated (A4C):                            55.2 %                            EF Calculated (2D): 47.6                            %                             LVOT: 2 cm  Doppler Measurements & Calculations                               Estimated RVSP: 38 mmHg                              Estimated RAP:3 mmHg    Estimated PASP: 30.46 mmHg TR Velocity:262 cm/s                              TR Gradient:27.46 mmHg      Xr Pelvis (1-2 Views)    Result Date: 3/11/2021  EXAMINATION: ONE XRAY VIEW OF THE PELVIS 3/11/2021 6:38 pm COMPARISON: None.  HISTORY: ORDERING SYSTEM PROVIDED HISTORY: fall TECHNOLOGIST PROVIDED HISTORY: Reason for exam:->fall Reason for Exam: pain Acuity: Acute Type of Exam: Initial Mechanism of Injury: fall Relevant Medical/Surgical History: none FINDINGS: Unremarkable appearance of the right and left hemipelvis, articulating normally at the [facility] joints and pubic symphysis. Visualized portions of the proximal right and left femurs appear intact and articulate normally at the right and left acetabulum. No acute osseous abnormality on AP pelvis radiograph. If pain persists or worsens, then additional evaluation with MRI is indicated to ensure no underlying radiographically occult process such as fracture, AVN or transient osteoporosis. Xr Shoulder Right (min 2 Views)    Result Date: 3/11/2021  EXAMINATION: TWO XRAY VIEWS OF THE RIGHT SHOULDER 3/11/2021 6:38 pm COMPARISON: None. HISTORY: ORDERING SYSTEM PROVIDED HISTORY: fall TECHNOLOGIST PROVIDED HISTORY: Reason for exam:->fall Reason for Exam: pain Acuity: Acute Type of Exam: Initial Mechanism of Injury: fall Relevant Medical/Surgical History: none FINDINGS: Osseous structures of the right shoulder are intact and aligned normally. AC joint space narrowing with marginal osteophytosis reflects AC joint osteoarthritis. No retained radiopaque foreign body. Visualized portions of the upper outer right hemithorax appear unremarkable. Mild AC joint osteoarthritis. No acute osseous abnormality. Follow-up imaging recommended if pain persists or worsens following conservative management. Ct Head Wo Contrast    Result Date: 3/11/2021  EXAMINATION: CT OF THE HEAD WITHOUT CONTRAST  3/11/2021 5:59 pm TECHNIQUE: CT of the head was performed without the administration of intravenous contrast. Dose modulation, iterative reconstruction, and/or weight based adjustment of the mA/kV was utilized to reduce the radiation dose to as low as reasonably achievable. COMPARISON: 06/21/2019 HISTORY: ORDERING SYSTEM PROVIDED HISTORY: fall TECHNOLOGIST PROVIDED HISTORY: Has a \"code stroke\" or \"stroke alert\" been called? ->No Reason for exam:->fall Decision Support Exception->Emergency Medical Condition (MA) Reason for Exam: fall, head/neck pain.  Acuity: Acute Type of Exam: Initial Mechanism of Injury: fall, head/neck TECHNOLOGIST PROVIDED HISTORY: Reason for exam:->fall Reason for Exam: pain Acuity: Acute Type of Exam: Initial Mechanism of Injury: fall Relevant Medical/Surgical History: none FINDINGS: Calcifications involving the aorta reflect atherosclerosis. The cardiomediastinal and hilar silhouettes appear otherwise unremarkable. Senescent pulmonary changes. No focal consolidation evident. No pleural fluid evident. No pneumothorax is seen. No acute osseous abnormality is identified. Thoracolumbar spinal fixation. No definite acute pulmonary disease. Senescent pulmonary changes. Calcific atherosclerotic disease aorta.      Discharge Time of 20 minutes    Electronically signed by Yris Markham MD on 3/15/2021 at 2:29 PM

## 2021-03-15 NOTE — CARE COORDINATION
Spoke with Edmundo allen, who stated pt is approved and can admit once medically stable. Perfect serve sent to Dr. Anatoly Ventura with update. Pt will need a COVID prior to discharge.

## 2021-03-15 NOTE — PROGRESS NOTES
Cardiology Progress Note     Today's Plan : will sign off and be available  if needed  Admit Date:  3/11/2021    Consult reason/ Seen today for: elevated troponin    Subjective and  Overnight Events: denies chest pain or shortness of breath    She again declines further cardiac testing  Telemetry SR with PAC-  [facility]    Assessment / Plan / Recommendation:     1. Elevated troponin in the setting of CAD--has known history of CAD with PCI of LAD in the remote past-medical management at this time based on patient's age and other comorbidities - continue with statin, aspirin, beta-blocker- offered stress test-she is not wanting testing at this time-   2. Valvular heart disease: echocardiogram with moderate to severe AR moderate, moderate moderate MR and TR-conservative management at this time- continue with low dose ACE-in the setting of severe AR it is reasonable to maintain blood pressure > 130/80. Will follow as outpatient   3. Hypertension-bp is elevated today will increase lisinopril. Dyslipidemia-continue with statin  4. Fall-mechanical      History of Presenting Illness:    Chief complain on admission : 80 y. o.year old who is admitted for  Chief Complaint   Patient presents with   Yuko Bond Fall        Past medical history:    has a past medical history of Allergic rhinitis, Angina pectoris ([address] Aortic regurgitation, Arthritis, CAD (coronary artery disease), CHF (congestive heart failure) ([address] Chronic back pain, Dyslipidemia, GERD (gastroesophageal reflux disease), H/O cardiovascular stress test, H/O cardiovascular stress test, H/O CT scan of abdomen, H/O Doppler ultrasound, H/O Doppler ultrasound, H/O Doppler ultrasound, H/O Doppler ultrasound, H/O echocardiogram, H/O echocardiogram, H/O echocardiogram, History of PTCA, [place] (hard of hearing), Hx of cardiovascular stress test, Hx of Doppler ultrasound, Hyperlipidemia, Hypertension, Iron deficiency anemia, Left nephrolithiasis, MI, old, Migraine, Mitral regurgitation, Muscle weakness (generalized), Orthostatic hypotension, Osteoarthritis, Osteoporosis, Renal disease, Restless legs syndrome, and S/P pericardiocentesis. Past surgical history:   has a past surgical history that includes Hysterectomy; Pericardium surgery (9/2006); Cataract removal; and Coronary angioplasty with stent (2006). Social History:   reports that she has never smoked. She has never used smokeless tobacco. She reports that she does not drink alcohol or use drugs. Family history:  family history is not on file. Allergies   Allergen Reactions    Ibuprofen Itching    Antivert [Meclizine Hcl]     Bactrim [Sulfamethoxazole-Trimethoprim]     Codeine     Erythromycin     Macrobid [Nitrofurantoin Macrocrystal]     Meclizine     Niacin And Related     Seldane [Terfenadine]     Sulfa Antibiotics     Tramadol Other (See Comments)    Ultram [Tramadol Hcl]     Aspirin Other (See Comments)     Pt cannot have ASA in high doses.         Review of Systems:   All 14 systems were reviewed and are negative  Except for the positive findings which are documented     BP (!) 180/66   Pulse 68   Temp 97.6 °F (36.4 °C) (Oral)   Resp 18   Ht 4' 11\" (1.499 m)   Wt 151 lb (68.5 kg)   SpO2 90%   BMI 30.50 kg/m²       Intake/Output Summary (Last 24 hours) at 3/15/2021 1211  Last data filed at 3/14/2021 1340  Gross per 24 hour   Intake 360 ml   Output --   Net 360 ml       Physical Exam:  Constitutional:   No acute distress  HENT:  Normocephalic, Atraumatic, Bilateral external ears normal,  Nose normal.   Neck- trachea is midline  Eyes:  PEERL, Conjunctiva normal  Respiratory:  Normal breath sounds, No respiratory distress  Cardiovascular:  Normal heart rate, Normal rhythm, + murmur appreciated, No rubs appreciated, No gallops appreciated, JVP not elevated  Abdomen/GI:  Soft, No tenderness  Musculoskeletal:  Intact distal pulses, questions. Electronically signed by MERRICK Mazariegos CNP on 3/15/2021 at 12:11 PM     I have seen ,spoken to  and examined this patient personally, independently of the nurse practitioner. I have reviewed the hospital care given to date and reviewed all pertinent labs and imaging. The plan was developed mutually at the time of the visit with the patient,  NP  and myself. I have spoken with patient, nursing staff and provided written and verbal instructions . The above note has been reviewed and I agree with the assessment, diagnosis, and treatment plan with changes made by me as follows     CARDIOLOGY ATTENDING ADDENDUM    HPI:  I have reviewed the above HPI  And agree with above   Linda Moore is a 80 y. o.year old who and presents with had concerns including Fall. Chief Complaint   Patient presents with    Fall     Interval history:  No CP    Physical Exam:  General:  Awake, alert, NAD  Head:normal  Eye:normal  Neck:  No JVD   Chest:  Clear to auscultation, respiration easy  Cardiovascular:  RRR S1S2  Abdomen:   nontender  Extremities:  no edema  Pulses; palpable  Neuro: grossly normal      MEDICAL DECISION MAKING;    I agree with the above plan, which was planned by myself and discussed with NP.   Conservative treatment        Kerry Holden MD [facility]

## 2021-03-15 NOTE — PROGRESS NOTES
Up date given to pt daughter. Questions answered. Daughter is aware that pt is refusing to take her medications.

## 2021-03-15 NOTE — PROGRESS NOTES
time is between 4 and 430 with QTC per wheel chair. Pt daughter Gabriella Hook has been notified of transport time.

## 2021-03-15 NOTE — PROGRESS NOTES
Hospitalist Progress Note      Name:  Peter Pizano /Age/Sex: 3/7/1930  (80 y.o. female)   MRN & CSN:  [id number] & [id number] Admission Date/Time: 3/11/2021  4:31 PM   [facility] Day: 5    Assessment and Plan:   Peter Pizano is a 80 y.o.  female  who presents with Elevated troponin    Elevated troponin  EKG: Normal sinus rhythm with T WI in leads V4 through V6  Troponin stable, trending down. Patient denied any chest pain or shortness of breath  Echocardiogram with ejection fraction 50%, moderate to severe aortic regurgitation, mild to moderate mitral regurgitation, tricuspid regurgitation. Cardiology on consult. Medical management for now.     Mechanical fall  Likely due to medication side effect  Avoid sedating medications  PT OT -recommending skilled nursing placement    Acute metabolic encephalopathy: Likely from urinary tract infection. Started on Rocephin. Urine culture with E. Coli. Will start nitrofurantoin. Essential hypertension: Uncontrolled this morning. Refused to take oral medications. Give hydralazine.     Other chronic medical conditions, medication resumed unless contraindicated    CAD status post stent  Hyperlipidemia  GERD  [place]  DVT on Eliquis    Awaiting placement     Diet DIET LOW SODIUM 2 GM;   DVT Prophylaxis [] Lovenox, []  Heparin, [] SCDs, [] Warfarin  [x] NOAC     GI Prophylaxis [x] PPI,  [] H2 Blocker,  [] Carafate,  [] Diet/Tube Feeds   Code Status Full Code   MDM [] Low, [x] Moderate,[]  High     History of Present Illness:     Chief Complaint: Elevated troponin    Seen and examined today. Refused to take oral meds this morning. Dysuria has improved. No fever or chills. Ten point ROS reviewed negative, unless as noted above    Objective:        Intake/Output Summary (Last 24 hours) at 3/15/2021 1123  Last data filed at 3/14/2021 1340  Gross per 24 hour   Intake 360 ml   Output --   Net 360 ml Vitals:   Vitals:    03/15/21 1100   BP: (!) 183/75   Pulse: 64   Resp:    Temp:    SpO2:      Physical Exam:   GEN Awake female, sitting upright in bed in no apparent distress. Appears given age. Hard of hearing. Some confusion  EYES Pupils are equally round. No scleral erythema, discharge, or conjunctivitis. HENT Mucous membranes are moist. Oral pharynx without exudates, no evidence of thrush. NECK Supple, no apparent thyromegaly or masses. RESP Clear to auscultation, no wheezes, rales or rhonchi. Symmetric chest movement while on room air. CARDIO/VASC S1/S2 auscultated. Regular rate without appreciable murmurs, rubs, or gallops. No JVD or carotid bruits. Peripheral pulses equal bilaterally and palpable. No peripheral edema. GI Abdomen is soft without significant tenderness, masses, or guarding. Bowel sounds are normoactive. Rectal exam deferred.      Medications:   Medications:    lisinopril  20 mg Oral Daily    phenazopyridine  200 mg Oral TID WC    fluticasone  1 spray Each Nostril Daily    cefTRIAXone (ROCEPHIN) IV  1,000 mg Intravenous Q24H    cetirizine  10 mg Oral Daily    aspirin  81 mg Oral Daily    atorvastatin  40 mg Oral Nightly    calcium elemental  500 mg Oral BID    docusate sodium  100 mg Oral Daily    apixaban  5 mg Oral BID    ferrous sulfate  325 mg Oral Daily with breakfast    furosemide  20 mg Oral Daily    metoprolol tartrate  50 mg Oral BID    pantoprazole  40 mg Oral Daily    vitamin B-12  500 mcg Oral Daily    vitamin D  50,000 Units Oral Q30 Days    sodium chloride flush  10 mL Intravenous BID      Infusions:   PRN Meds: hydrALAZINE (APRESOLINE) ivpb, 10 mg, Q4H PRN  sodium chloride, 1 spray, Q2H PRN  aluminum & magnesium hydroxide-simethicone, 30 mL, Q6H PRN  hydrocortisone, 25 mg, BID PRN  ipratropium-albuterol, 1 ampule, Q4H PRN  sodium chloride flush, 10 mL, PRN  promethazine, 12.5 mg, Q6H PRN    Or  ondansetron, 4 mg, Q6H PRN  polyethylene glycol, 17 g, Daily PRN  acetaminophen, 650 mg, Q6H PRN    Or  acetaminophen, 650 mg, Q6H PRN      Imaging reviewed    Electronically signed by Brandon Hernandez MD on 3/15/2021 at 11:23 AM

## 2021-03-16 LAB
EKG ATRIAL RATE: 78 BPM
EKG DIAGNOSIS: NORMAL
EKG P AXIS: 61 DEGREES
EKG P-R INTERVAL: 174 MS
EKG Q-T INTERVAL: 416 MS
EKG QRS DURATION: 114 MS
EKG QTC CALCULATION (BAZETT): 474 MS
EKG R AXIS: -22 DEGREES
EKG T AXIS: 145 DEGREES
EKG VENTRICULAR RATE: 78 BPM

## 2021-04-11 PROBLEM — R79.89 ELEVATED TROPONIN: Status: RESOLVED | Noted: 2021-03-11 | Resolved: 2021-04-11

## 2021-04-11 PROBLEM — R77.8 ELEVATED TROPONIN: Status: RESOLVED | Noted: 2021-03-11 | Resolved: 2021-04-11

## 2021-11-10 ENCOUNTER — OFFICE VISIT (OUTPATIENT)
Dept: CARDIOLOGY CLINIC | Age: 86
End: 2021-11-10
Payer: MEDICAID

## 2021-11-10 VITALS
SYSTOLIC BLOOD PRESSURE: 130 MMHG | WEIGHT: 156.4 LBS | DIASTOLIC BLOOD PRESSURE: 90 MMHG | HEIGHT: 59 IN | BODY MASS INDEX: 31.53 KG/M2 | HEART RATE: 63 BPM

## 2021-11-10 DIAGNOSIS — I10 ESSENTIAL HYPERTENSION: Primary | ICD-10-CM

## 2021-11-10 PROCEDURE — G8427 DOCREV CUR MEDS BY ELIG CLIN: HCPCS | Performed by: INTERNAL MEDICINE

## 2021-11-10 PROCEDURE — 1036F TOBACCO NON-USER: CPT | Performed by: INTERNAL MEDICINE

## 2021-11-10 PROCEDURE — 1090F PRES/ABSN URINE INCON ASSESS: CPT | Performed by: INTERNAL MEDICINE

## 2021-11-10 PROCEDURE — G8484 FLU IMMUNIZE NO ADMIN: HCPCS | Performed by: INTERNAL MEDICINE

## 2021-11-10 PROCEDURE — 1123F ACP DISCUSS/DSCN MKR DOCD: CPT | Performed by: INTERNAL MEDICINE

## 2021-11-10 PROCEDURE — G8417 CALC BMI ABV UP PARAM F/U: HCPCS | Performed by: INTERNAL MEDICINE

## 2021-11-10 PROCEDURE — 4040F PNEUMOC VAC/ADMIN/RCVD: CPT | Performed by: INTERNAL MEDICINE

## 2021-11-10 PROCEDURE — 99214 OFFICE O/P EST MOD 30 MIN: CPT | Performed by: INTERNAL MEDICINE

## 2021-11-10 RX ORDER — ONDANSETRON 4 MG/1
TABLET, FILM COATED ORAL
COMMUNITY
Start: 2021-09-18

## 2021-11-10 RX ORDER — ALENDRONATE SODIUM 70 MG/1
TABLET ORAL
COMMUNITY
Start: 2021-09-28

## 2021-11-10 RX ORDER — METHYLPREDNISOLONE 4 MG/1
TABLET ORAL
COMMUNITY
Start: 2021-10-21

## 2021-11-10 NOTE — PROGRESS NOTES
CARDIOLOGY NOTE      11/10/2021    RE: Michelle Rai  (3/7/1930)                               TO:  Dr. Kaylen Martinez is a 80 y.o. female who was seen today for management of  cad                                    HPI:                   The patient does not have cardiac complaints  Patient also seen  for    - Coronary artery disease, does not have chest pain. Patient is  compliant with prescribed medicines. - Hypertension,is  well controlled, pt is  compliant with medicines  - Hyperlipidimea, importance of hyperlipidimea discussed with pt.   - VHD no SOB    Michelle Rai has the following history recorded in care path:  Patient Active Problem List    Diagnosis Date Noted    Chest pain 03/14/2021    SVT [facility]supraventricular tachycardia) ([facility] 08/14/2020    DVT (deep vein thrombosis) in pregnancy 08/14/2020    Swelling of extremity 05/04/2018    Low back pain without sciatica 04/27/2016    Claudication ([facility] 04/17/2014    MI, old     CHF (congestive heart failure) ([facility]     Headache 07/16/2013    Hypertension     CAD (coronary artery disease)     Orthostatic hypotension     Angina pectoris (HCC)     Aortic regurgitation     S/P pericardiocentesis      Current Outpatient Medications   Medication Sig Dispense Refill    alendronate (FOSAMAX) 70 MG tablet       methylPREDNISolone (MEDROL DOSEPACK) 4 MG tablet       ondansetron (ZOFRAN) 4 MG tablet       fluticasone (FLONASE) 50 MCG/ACT nasal spray 1 spray by Each Nostril route daily 1 Bottle 3    lisinopril (PRINIVIL;ZESTRIL) 10 MG tablet Take 1 tablet by mouth daily 30 tablet 3    neomycin-bacitracin-polymyxin (NEOSPORIN) 400-5-5000 ointment Apply topically 2 times daily Apply topically 2 times daily.       guaiFENesin (ROBITUSSIN MUCUS+CHEST CONGEST) 100 MG/5ML liquid Take 200 mg by mouth 4 times daily as needed for Cough      metoprolol tartrate (LOPRESSOR) 50 MG tablet Take 1 tablet by mouth 2 times daily 60 tablet 2    Multiple Vitamins-Minerals (PRESERVISION AREDS 2 PO) Take by mouth daily      ELIQUIS 5 MG TABS tablet 1 tablet 2 times daily      hydrocortisone (ANUSOL-HC) 25 MG suppository Place 25 mg rectally 2 times daily as needed for Hemorrhoids      ipratropium-albuterol (DUONEB) 0.5-2.5 (3) MG/3ML SOLN nebulizer solution as needed      aluminum & magnesium hydroxide-simethicone (MYLANTA) 400-400-40 MG/5ML SUSP Take 30 mLs by mouth every 6 hours as needed      vitamin D (CHOLECALCIFEROL) 92440 UNIT CAPS Take 50,000 Units by mouth every 30 days      docusate sodium (COLACE) 100 MG capsule Take 100 mg by mouth Daily      ferrous sulfate 325 (65 Fe) MG tablet Take 325 mg by mouth daily (with breakfast)      furosemide (LASIX) 20 MG tablet Take 20 mg by mouth daily      atorvastatin (LIPITOR) 40 MG tablet Take 40 mg by mouth nightly      nystatin (MYCOSTATIN) 185001 UNIT/GM powder Apply topically every 12 hours as needed (yeast)      risedronate (ACTONEL) 35 MG tablet Take 35 mg by mouth every 7 days      polyethyl glycol-propyl glycol 0.4-0.3 % (SYSTANE) 0.4-0.3 % ophthalmic solution Place 2 drops into both eyes as needed for Dry Eyes      lidocaine (LIDODERM) 5 % Place 1 patch onto the skin daily 12 hours on, 12 hours off.  30 patch 0    pantoprazole (PROTONIX) 40 MG tablet Take 1 tablet by mouth daily 90 tablet 1    loratadine (CLARITIN) 10 MG tablet Take 1 tablet by mouth daily 90 tablet 1    acetaminophen (TYLENOL ARTHRITIS PAIN) 650 MG CR tablet Take 650 mg by mouth every 6 hours as needed for Pain       calcium carbonate 600 MG TABS tablet Take 1 tablet by mouth 2 times daily       aspirin 81 MG chewable tablet Take 81 mg by mouth once a week       vitamin B-12 (CYANOCOBALAMIN) 500 MCG tablet Take 500 mcg by mouth daily       Humidifiers (COOL MIST HUMIDIFIER 0.8 GAL) MISC 1 Dose by Does not apply route nightly 1 each 0     No current facility-administered medications for this visit. Allergies: Ibuprofen, Antivert [meclizine hcl], Bactrim [sulfamethoxazole-trimethoprim], Codeine, Erythromycin, Macrobid [nitrofurantoin macrocrystal], Meclizine, Niacin and related, Seldane [terfenadine], Sulfa antibiotics, Tramadol, Ultram [tramadol hcl], and Aspirin  Past Medical History:   Diagnosis Date    Allergic rhinitis     Angina pectoris (HCC)     Aortic regurgitation     Mod to severe aortic regurg    Arthritis     CAD (coronary artery disease)     CHF (congestive heart failure) (HCC)     Chronic back pain     Dyslipidemia     GERD (gastroesophageal reflux disease)     H/O cardiovascular stress test 9/22/10    Cardiolite: Large Apical MI, associate mild EF 62 %    H/O cardiovascular stress test 10/29/2012    EF 68%, small in size and mild intensity perfusion defect in the apical wall    H/O CT scan of abdomen 9/13    Aortic atherosclerosis present w/subtle 2.0 cm infrarenal abd.aortic aneurysm.  H/O Doppler ultrasound 9/22/10    Carotid: estimated stenosis->50 % B/L    H/O Doppler ultrasound 10/13    Abd.U/S-interpretation=Cholelithiasis    H/O Doppler ultrasound 10/29/14    Arterial: No significant stenosis bilaterally.  H/O Doppler ultrasound 05/14/2018    venous     H/O echocardiogram 10/11, 9/10, 9/09    Echo: EF 65 %, normal LVSF, mod concentric LV hypertrophy, mod LVD dysfunction, Rt vent enlargement, Mild mitral regurg and mod aortic regurg,     H/O echocardiogram 10/29/12    EF 55%, normal lv systolic function, mild tricuspid regurg    H/O echocardiogram 08/13/15    EF 60% Mildly hypertrophied LV. Sclerotic aortic valve. Mild AR.  History of PTCA 2006    Stent to LAD.     [place] (hard of hearing)     Hx of cardiovascular stress test 8/13/2015    lexiscan-scar apical,no change from last study,EF70%    Hx of Doppler ultrasound 07/2013    carotid: WNL    Hyperlipidemia     Hypertension     Iron deficiency anemia     chronic-Per [facility] progress note- 8/22/2012    Left nephrolithiasis     MI, old     apical    Migraine     Mitral regurgitation 9/22/10    mild to mod MR, stress cardiolite: Large apical MI,  Associate mild  EF 62%    Muscle weakness (generalized)     chronic-Per [facility] progress note 8/22/2012    Orthostatic hypotension     Osteoarthritis     Osteoporosis     Renal disease     Restless legs syndrome     S/P pericardiocentesis 2006     Past Surgical History:   Procedure Laterality Date    CATARACT REMOVAL      b/l    CORONARY ANGIOPLASTY WITH STENT PLACEMENT  2006    stent to LAD    HYSTERECTOMY      PERICARDIUM SURGERY  9/2006    pericardial window and pericardiocentesis      As reviewed No family history on file. Social History     Tobacco Use    Smoking status: Never Smoker    Smokeless tobacco: Never Used   Substance Use Topics    Alcohol use: No      Review of Systems:    Constitutional: Negative for diaphoresis and fatigue  Psychological:Negative for anxiety or depression  HENT: Negative for headaches, nasal congestion, sinus pain or vertigo  Eyes: Negative for visual disturbance.    Endocrine: Negative for polydipsia/polyuria  Respiratory: Negative for shortness of breath  Cardiovascular: Negative for chest pain, dyspnea on exertion, claudication, edema, irregular heartbeat, murmur, palpitations or shortness of breath  Gastrointestinal: Negative for abdominal pain or heartburn  Genito-Urinary: Negative for urinary frequency/urgency  Musculoskeletal: Negative for muscle pain, muscular weakness, negative for pain in arm and leg or swelling in foot and leg  Neurological: Negative for dizziness, headaches, memory loss, numbness/tingling, visual changes, syncope  Dermatological: Negative for rash    Objective:    Vitals:    11/10/21 1323   BP: (!) 130/90   Pulse: 63   Weight: 156 lb 6.4 oz (70.9 kg)   Height: 4' 11\" (1.499 m)     BP (!) 130/90   Pulse 63   Ht 4' 11\" (1.499 m)   Wt 156 lb 6.4 oz (70.9 kg)   BMI 31.59 kg/m²     No flowsheet data found. Wt Readings from Last 3 Encounters:   11/10/21 156 lb 6.4 oz (70.9 kg)   03/13/21 151 lb (68.5 kg)   11/19/20 151 lb (68.5 kg)     Body mass index is 31.59 kg/m². GENERAL - Alert, oriented, pleasant, in no apparent distress. EYES: No jaundice, no conjunctival pallor. SKIN: It is warm & dry. No rashes. No Echhymosis    HEENT - No clinically significant abnormalities seen. Neck - Supple. No jugular venous distention noted. No carotid bruits. Cardiovascular - Normal S1 and S2 without obvious murmur or gallop. Extremities - No cyanosis, clubbing, or significant edema. Pulmonary - No respiratory distress. No wheezes or rales. Abdomen - No masses, tenderness, or organomegaly. Musculoskeletal - No significant edema. No joint deformities. No muscle wasting. Neurologic - Cranial nerves II through XII are grossly intact. There were no gross focal neurologic abnormalities.     Lab Review   Lab Results   Component Value Date    TROPONINT 0.049 03/12/2021     BNP:  No results found for: BNP  PT/INR:    Lab Results   Component Value Date    INR 1.06 07/27/2017     No results found for: LABA1C  Lab Results   Component Value Date    WBC 7.6 03/12/2021    HCT 41.5 03/12/2021    MCV 92.2 03/12/2021     03/12/2021     Lab Results   Component Value Date    CHOL 136 07/27/2016    TRIG 236 (H) 07/27/2016    HDL 34 (L) 07/27/2016    LDLCALC 59 11/11/2014    LDLDIRECT 58 07/27/2016     Lab Results   Component Value Date    ALT 10 03/11/2021    AST 15 03/11/2021     BMP:    Lab Results   Component Value Date     03/12/2021    K 3.8 03/12/2021     03/12/2021    CO2 23 03/12/2021    BUN 11 03/12/2021    CREATININE 0.7 03/12/2021     CMP:   Lab Results   Component Value Date     03/12/2021    K 3.8 03/12/2021     03/12/2021    CO2 23 03/12/2021    BUN 11 03/12/2021    PROT 5.2 03/11/2021    PROT 6.4 08/14/2012     TSH:    Lab Results Component Value Date    TSH 2.480 10/08/2015       QUALITY MEASURES REVIEWED:      Impression:    1. Essential hypertension       Patient Active Problem List   Diagnosis Code    Hypertension I10    CAD (coronary artery disease) I25.10    Orthostatic hypotension I95.1    Angina pectoris ([facility]) I20.9    Aortic regurgitation I35.1    S/P pericardiocentesis Z98.890    Headache R51.9    MI, old I25.2    CHF (congestive heart failure) ([facility]) I50.9    Claudication ([facility]) I73.9    Low back pain without sciatica M54.50    Swelling of extremity M79.89    SVT (supraventricular tachycardia) ([facility]) I47.1    DVT (deep vein thrombosis) in pregnancy O22.30    Chest pain R07.9       Assessment & Plan:               -     CORONARY ARTERY DISEASE:  asymptomatic     All available  tests in chart reviewed. Management discussed . Testing ordered  no                                 -  Hypertension: Patients blood pressure is normal. Patient is advised about low sodium diet. Present medical regimen will not be changed. -  LIPID MANAGEMENT:  Importance of lipid levels discussed with patient   and patient was given dietary advice. NCEP- ATP III guidelines reviewed with patient. -   Changes  in medicines made:  No                                - VHD  No issues        Viola Zavala MD    [facility] [place]

## 2022-11-22 ENCOUNTER — OFFICE VISIT (OUTPATIENT)
Dept: CARDIOLOGY CLINIC | Age: 87
End: 2022-11-22
Payer: COMMERCIAL

## 2022-11-22 VITALS
DIASTOLIC BLOOD PRESSURE: 70 MMHG | HEIGHT: 59 IN | BODY MASS INDEX: 31.45 KG/M2 | HEART RATE: 68 BPM | SYSTOLIC BLOOD PRESSURE: 130 MMHG | WEIGHT: 156 LBS

## 2022-11-22 DIAGNOSIS — I35.1 AORTIC VALVE INSUFFICIENCY, ETIOLOGY OF CARDIAC VALVE DISEASE UNSPECIFIED: ICD-10-CM

## 2022-11-22 DIAGNOSIS — I73.9 CLAUDICATION (HCC): ICD-10-CM

## 2022-11-22 DIAGNOSIS — I25.10 CORONARY ARTERY DISEASE INVOLVING NATIVE HEART WITHOUT ANGINA PECTORIS, UNSPECIFIED VESSEL OR LESION TYPE: Primary | ICD-10-CM

## 2022-11-22 DIAGNOSIS — I47.1 SVT (SUPRAVENTRICULAR TACHYCARDIA) (HCC): ICD-10-CM

## 2022-11-22 DIAGNOSIS — I25.2 MI, OLD: ICD-10-CM

## 2022-11-22 DIAGNOSIS — I10 PRIMARY HYPERTENSION: ICD-10-CM

## 2022-11-22 DIAGNOSIS — I95.1 ORTHOSTATIC HYPOTENSION: ICD-10-CM

## 2022-11-22 DIAGNOSIS — I50.32 CHRONIC DIASTOLIC CONGESTIVE HEART FAILURE (HCC): ICD-10-CM

## 2022-11-22 DIAGNOSIS — Z98.890 S/P PERICARDIOCENTESIS: ICD-10-CM

## 2022-11-22 PROCEDURE — G8427 DOCREV CUR MEDS BY ELIG CLIN: HCPCS | Performed by: NURSE PRACTITIONER

## 2022-11-22 PROCEDURE — G8417 CALC BMI ABV UP PARAM F/U: HCPCS | Performed by: NURSE PRACTITIONER

## 2022-11-22 PROCEDURE — 1123F ACP DISCUSS/DSCN MKR DOCD: CPT | Performed by: NURSE PRACTITIONER

## 2022-11-22 PROCEDURE — 1090F PRES/ABSN URINE INCON ASSESS: CPT | Performed by: NURSE PRACTITIONER

## 2022-11-22 PROCEDURE — G8484 FLU IMMUNIZE NO ADMIN: HCPCS | Performed by: NURSE PRACTITIONER

## 2022-11-22 PROCEDURE — 1036F TOBACCO NON-USER: CPT | Performed by: NURSE PRACTITIONER

## 2022-11-22 PROCEDURE — 99214 OFFICE O/P EST MOD 30 MIN: CPT | Performed by: NURSE PRACTITIONER

## 2022-11-22 RX ORDER — AMLODIPINE BESYLATE 2.5 MG/1
TABLET ORAL
COMMUNITY
Start: 2022-11-08

## 2022-11-22 NOTE — PROGRESS NOTES
11/22/2022  Primary cardiologist: Dr. Analisa Canales:   Sherry Kimble  is an established 80 y.o.  female here for a 12 month follow up      SUBJECTIVE/OBJECTIVE:  Sherry Kimble is a 80 y.o. female with a history of   1. Coronary artery disease involving native heart without angina pectoris, unspecified vessel or lesion type    2. Primary hypertension    3. Orthostatic hypotension    4. Aortic valve insufficiency, etiology of cardiac valve disease unspecified    5. Chronic diastolic congestive heart failure ([address]    6. SVT (supraventricular tachycardia) ([address]    7. S/P pericardiocentesis    8. Claudication ([address]    9. MI, old         HPI :   Sherry Kimble reports that she lives in a skilled nursing acility. She is upset with one of the nurses and repeats the story multiple times with different information. Her daughter is present and reports that the story is an old occurrence and that the patient does not remember much. Review of Systems   Constitutional: Negative for malaise/fatigue. Eyes:  Negative for visual disturbance. Cardiovascular:  Positive for leg swelling (improved with compression stockings). Negative for chest pain, claudication, cyanosis, dyspnea on exertion, irregular heartbeat, near-syncope, orthopnea, palpitations, paroxysmal nocturnal dyspnea and syncope. Respiratory:  Negative for shortness of breath and sleep disturbances due to breathing. Musculoskeletal:         Uses a wheel chair   Neurological:  Negative for focal weakness, light-headedness and weakness. Psychiatric/Behavioral:  Positive for altered mental status. Negative for depression. The patient is not nervous/anxious. Vitals:    11/22/22 1515   BP: 130/70   Site: Right Upper Arm   Position: Sitting   Cuff Size: Medium Adult   Pulse: 68   Weight: 156 lb (70.8 kg)   Height: 4' 11\" (1.499 m)     No flowsheet data found.   Wt Readings from Last 3 Encounters:   11/22/22 156 lb (70.8 kg)   11/10/21 156 lb 6.4 oz (70.9 kg)   03/13/21 151 lb (68.5 kg)     Body mass index is 31.51 kg/m². Physical Exam  Vitals reviewed. Constitutional:       General: She is not in acute distress. Appearance: Normal appearance. She is obese. She is not ill-appearing. HENT:      Head: Atraumatic. Neck:      Vascular: No carotid bruit. Cardiovascular:      Rate and Rhythm: Normal rate and regular rhythm. Pulses: Normal pulses. Heart sounds: Normal heart sounds. No murmur heard. Pulmonary:      Effort: Pulmonary effort is normal. No respiratory distress. Breath sounds: Decreased breath sounds present. Musculoskeletal:         General: No deformity. Cervical back: Neck supple. No muscular tenderness. Right lower leg: Edema present. Left lower leg: Edema present. Neurological:      Mental Status: She is alert. Current Outpatient Medications   Medication Sig Dispense Refill    amLODIPine (NORVASC) 2.5 MG tablet       alendronate (FOSAMAX) 70 MG tablet       methylPREDNISolone (MEDROL DOSEPACK) 4 MG tablet       ondansetron (ZOFRAN) 4 MG tablet       fluticasone (FLONASE) 50 MCG/ACT nasal spray 1 spray by Each Nostril route daily 1 Bottle 3    lisinopril (PRINIVIL;ZESTRIL) 10 MG tablet Take 1 tablet by mouth daily 30 tablet 3    neomycin-bacitracin-polymyxin (NEOSPORIN) 400-5-5000 ointment Apply topically 2 times daily Apply topically 2 times daily.       guaiFENesin (ROBITUSSIN) 100 MG/5ML liquid Take 200 mg by mouth 4 times daily as needed for Cough      metoprolol tartrate (LOPRESSOR) 50 MG tablet Take 1 tablet by mouth 2 times daily 60 tablet 2    Multiple Vitamins-Minerals (PRESERVISION AREDS 2 PO) Take by mouth daily      ELIQUIS 5 MG TABS tablet 1 tablet 2 times daily      hydrocortisone (ANUSOL-HC) 25 MG suppository Place 25 mg rectally 2 times daily as needed for Hemorrhoids      ipratropium-albuterol (DUONEB) 0.5-2.5 (3) MG/3ML SOLN nebulizer solution as needed      aluminum & magnesium hydroxide-simethicone (MYLANTA) 596-920-15 MG/5ML SUSP Take 30 mLs by mouth every 6 hours as needed      vitamin D (CHOLECALCIFEROL) 27427 UNIT CAPS Take 50,000 Units by mouth every 30 days      docusate sodium (COLACE) 100 MG capsule Take 100 mg by mouth Daily      ferrous sulfate 325 (65 Fe) MG tablet Take 325 mg by mouth daily (with breakfast)      furosemide (LASIX) 20 MG tablet Take 20 mg by mouth daily      atorvastatin (LIPITOR) 40 MG tablet Take 40 mg by mouth nightly      nystatin (MYCOSTATIN) 724956 UNIT/GM powder Apply topically every 12 hours as needed (yeast)      risedronate (ACTONEL) 35 MG tablet Take 35 mg by mouth every 7 days      polyethyl glycol-propyl glycol 0.4-0.3 % (SYSTANE) 0.4-0.3 % ophthalmic solution Place 2 drops into both eyes as needed for Dry Eyes      lidocaine (LIDODERM) 5 % Place 1 patch onto the skin daily 12 hours on, 12 hours off. 30 patch 0    pantoprazole (PROTONIX) 40 MG tablet Take 1 tablet by mouth daily 90 tablet 1    loratadine (CLARITIN) 10 MG tablet Take 1 tablet by mouth daily 90 tablet 1    acetaminophen (TYLENOL) 650 MG extended release tablet Take 650 mg by mouth every 6 hours as needed for Pain       calcium carbonate 600 MG TABS tablet Take 1 tablet by mouth 2 times daily       aspirin 81 MG chewable tablet Take 81 mg by mouth once a week       vitamin B-12 (CYANOCOBALAMIN) 500 MCG tablet Take 500 mcg by mouth daily       Humidifiers (COOL MIST HUMIDIFIER 0.8 GAL) MISC 1 Dose by Does not apply route nightly 1 each 0     No current facility-administered medications for this visit. All pertinent data reviewed and discussed with patient    2/2021 Echo   Summary   Patient refused exam; parasternal images were obtained. Left ventricular systolic function is low normal.   Ejection fraction is visually estimated at 50%. Moderate left ventricular hypertrophy. Calcified aortic valve.    Moderate to severe aortic regurgitation noted with color Doppler; vena contracta: 0.76 cm. Moderate mitral regurgitation. Moderate tricuspid regurgitation; RVSP: 38 mmHg consistent with mild PHTN. No evidence of any pericardial effusion. ASSESSMENT/PLAN:  ASCVD  Stable  Continue ASA metoprolol      HTN  Controlled  Continue Metoporlol, Lasix, norvasc      Dyslipidemia  Continue pravastatin    VHD  No shortness of breath or increased swelling. Continue to monitor. Tests ordered:  none    Follow-up  1 year     Signed:  MERRICK Cardona CNP, 11/22/2022, 3:30 PM    An electronic signature was used to authenticate this note. Please note this report has been partially produced using speech recognition software and may contain errors related to that system including errors in grammar, punctuation, and spelling, as well as words and phrases that may be inappropriate. If there are any questions or concerns please feel free to contact the dictating provider for clarification.

## 2022-11-23 ASSESSMENT — ENCOUNTER SYMPTOMS
SHORTNESS OF BREATH: 0
ORTHOPNEA: 0
SLEEP DISTURBANCES DUE TO BREATHING: 0

## 2023-02-23 NOTE — ED NOTES
22:06 verbal order per Fifi Berry RN to call for transport back to [facility], [facility] -- I called [facility] spoke to [id number] [facility] -- she gave me an eta of 00:00 -- notified Aaron Peralta  06/21/19 5044 [FreeTextEntry1] : Originally stage III NSCLC with adenocarcinoma histology s/p sequential chemo/RT completed June 2013.  Received SBRT to an enlarging MAICOL nodule in March 2016.  Surveillance CT scan in March 2019 with enlarging MAICOL nodule which was hypermetabolic on PET/CT with concern for LN involvement.  Underwent Bronch with EBUS biopsy on 4/25/19 with a Level 7 LN biopsy confirming adenocarcinoma consistent with lung primary.  Tumor tested PDL1 negative, tested TMB-High and molecular testing is negative for actionable mutations (TP53, among others).  Started systemic therapy with Carbo/Pem/Pembro in late May 2019.  Developed Carboplatin infusion reaction during C2 which was then discontinued.  Treated with Pem/Pembro going forward.  Achieved VT after C3.  Treated with maintenance Pem/Pembro with sustained response.  Altered to q6 week administration due to tolerability issues in July 2020 at patient’s insistence.  \par Restaging CT Feb 2023 with overall sustained response.  \par Recommend: \par -Continue treatment with combination Pem/Pembro as maintenance for as long as it benefits the patient administered q6 weeks due to tolerability issues.  Receives the q6 week dosing of Pembro (400mg) and usual Pemetrexed 500mg/m2 dosing at this interval.  \par -Continue Vit B12 q2 cycles and daily folic acid with Dex in the kathy-chemo setting.  \par -She will f/u with PCP for proteinuria after seeing Nephrology once\par -Insomnia: zolpidem ER or Clonazepam PRN (latter prescribed by PCP)\par -Hypothyroidism: On Levothyroxine. Will continue to monitor TFT’s with lab work performed with treatment \par -She has continued treatment scheduled for next week

## 2023-11-10 ENCOUNTER — HOSPITAL ENCOUNTER (INPATIENT)
Age: 88
LOS: 3 days | Discharge: OTHER INSTITUTION | DRG: 291 | End: 2023-11-13
Attending: EMERGENCY MEDICINE | Admitting: INTERNAL MEDICINE
Payer: MEDICARE

## 2023-11-10 ENCOUNTER — APPOINTMENT (OUTPATIENT)
Dept: GENERAL RADIOLOGY | Age: 88
DRG: 291 | End: 2023-11-10
Payer: MEDICARE

## 2023-11-10 DIAGNOSIS — J96.90 RESPIRATORY FAILURE, UNSPECIFIED CHRONICITY, UNSPECIFIED WHETHER WITH HYPOXIA OR HYPERCAPNIA (HCC): Primary | ICD-10-CM

## 2023-11-10 DIAGNOSIS — I50.9 ACUTE ON CHRONIC CONGESTIVE HEART FAILURE, UNSPECIFIED HEART FAILURE TYPE (HCC): ICD-10-CM

## 2023-11-10 PROBLEM — I50.23 ACUTE ON CHRONIC SYSTOLIC CHF (CONGESTIVE HEART FAILURE) (HCC): Status: ACTIVE | Noted: 2023-11-10

## 2023-11-10 LAB
ALBUMIN SERPL-MCNC: 3.4 GM/DL (ref 3.4–5)
ALP BLD-CCNC: 74 IU/L (ref 40–128)
ALT SERPL-CCNC: 21 U/L (ref 10–40)
ANION GAP SERPL CALCULATED.3IONS-SCNC: 11 MMOL/L (ref 4–16)
ANISOCYTOSIS: ABNORMAL
AST SERPL-CCNC: 29 IU/L (ref 15–37)
ATYPICAL LYMPHOCYTE ABSOLUTE COUNT: ABNORMAL
B PARAP IS1001 DNA NPH QL NAA+NON-PROBE: NOT DETECTED
B PERT.PT PRMT NPH QL NAA+NON-PROBE: NOT DETECTED
BACTERIA: ABNORMAL /HPF
BANDED NEUTROPHILS ABSOLUTE COUNT: 0.72 K/CU MM
BANDED NEUTROPHILS RELATIVE PERCENT: 4 % (ref 5–11)
BASE EXCESS: 3 (ref 0–2.4)
BILIRUB SERPL-MCNC: 0.3 MG/DL (ref 0–1)
BILIRUBIN URINE: NEGATIVE MG/DL
BLOOD, URINE: ABNORMAL
BUN SERPL-MCNC: 32 MG/DL (ref 6–23)
C PNEUM DNA NPH QL NAA+NON-PROBE: NOT DETECTED
CALCIUM SERPL-MCNC: 9.2 MG/DL (ref 8.3–10.6)
CHLORIDE BLD-SCNC: 102 MMOL/L (ref 99–110)
CLARITY: ABNORMAL
CO2: 23 MMOL/L (ref 21–32)
COLOR: YELLOW
COMMENT: ABNORMAL
CREAT SERPL-MCNC: 1 MG/DL (ref 0.6–1.1)
DIFFERENTIAL TYPE: ABNORMAL
EOSINOPHILS ABSOLUTE: 0.7 K/CU MM
EOSINOPHILS RELATIVE PERCENT: 4 % (ref 0–3)
FLUAV H1 2009 PAN RNA NPH NAA+NON-PROBE: NOT DETECTED
FLUAV H1 RNA NPH QL NAA+NON-PROBE: NOT DETECTED
FLUAV H3 RNA NPH QL NAA+NON-PROBE: NOT DETECTED
FLUAV RNA NPH QL NAA+NON-PROBE: NOT DETECTED
FLUBV RNA NPH QL NAA+NON-PROBE: NOT DETECTED
GFR SERPL CREATININE-BSD FRML MDRD: 53 ML/MIN/1.73M2
GLUCOSE SERPL-MCNC: 229 MG/DL (ref 70–99)
GLUCOSE, URINE: NEGATIVE MG/DL
HADV DNA NPH QL NAA+NON-PROBE: NOT DETECTED
HCO3 VENOUS: 23.6 MMOL/L (ref 19–25)
HCOV 229E RNA NPH QL NAA+NON-PROBE: NOT DETECTED
HCOV HKU1 RNA NPH QL NAA+NON-PROBE: NOT DETECTED
HCOV NL63 RNA NPH QL NAA+NON-PROBE: NOT DETECTED
HCOV OC43 RNA NPH QL NAA+NON-PROBE: NOT DETECTED
HCT VFR BLD CALC: 34.6 % (ref 37–47)
HEMOGLOBIN: 10 GM/DL (ref 12.5–16)
HMPV RNA NPH QL NAA+NON-PROBE: NOT DETECTED
HPIV1 RNA NPH QL NAA+NON-PROBE: NOT DETECTED
HPIV2 RNA NPH QL NAA+NON-PROBE: NOT DETECTED
HPIV3 RNA NPH QL NAA+NON-PROBE: NOT DETECTED
HPIV4 RNA NPH QL NAA+NON-PROBE: NOT DETECTED
HYALINE CASTS: 5 /LPF
KETONES, URINE: NEGATIVE MG/DL
LACTIC ACID, SEPSIS: 1.1 MMOL/L (ref 0.4–2)
LACTIC ACID, SEPSIS: 1.3 MMOL/L (ref 0.4–2)
LEUKOCYTE ESTERASE, URINE: ABNORMAL
LYMPHOCYTES ABSOLUTE: 6 K/CU MM
LYMPHOCYTES RELATIVE PERCENT: 33 % (ref 24–44)
M PNEUMO DNA NPH QL NAA+NON-PROBE: NOT DETECTED
MCH RBC QN AUTO: 23.8 PG (ref 27–31)
MCHC RBC AUTO-ENTMCNC: 28.9 % (ref 32–36)
MCV RBC AUTO: 82.2 FL (ref 78–100)
MONOCYTES ABSOLUTE: 0.7 K/CU MM
MONOCYTES RELATIVE PERCENT: 4 % (ref 0–4)
MUCUS: ABNORMAL HPF
NITRITE URINE, QUANTITATIVE: POSITIVE
O2 SAT, VEN: 95.5 % (ref 50–70)
PCO2, VEN: 48 MMHG (ref 38–52)
PDW BLD-RTO: 16.9 % (ref 11.7–14.9)
PH VENOUS: 7.3 (ref 7.32–7.42)
PH, URINE: 5.5 (ref 5–8)
PLATELET # BLD: 310 K/CU MM (ref 140–440)
PMV BLD AUTO: 11.2 FL (ref 7.5–11.1)
PO2, VEN: 157 MMHG (ref 28–48)
POTASSIUM SERPL-SCNC: 4.5 MMOL/L (ref 3.5–5.1)
PRO-BNP: 6804 PG/ML
PROCALCITONIN SERPL-MCNC: 0.41 NG/ML
PROTEIN UA: NEGATIVE MG/DL
RBC # BLD: 4.21 M/CU MM (ref 4.2–5.4)
RBC URINE: 3 /HPF (ref 0–6)
RSV RNA NPH QL NAA+NON-PROBE: NOT DETECTED
RV+EV RNA NPH QL NAA+NON-PROBE: NOT DETECTED
SARS-COV-2 RNA NPH QL NAA+NON-PROBE: NOT DETECTED
SEGMENTED NEUTROPHILS ABSOLUTE COUNT: 10 K/CU MM
SEGMENTED NEUTROPHILS RELATIVE PERCENT: 55 % (ref 36–66)
SODIUM BLD-SCNC: 136 MMOL/L (ref 135–145)
SPECIFIC GRAVITY UA: 1.02 (ref 1–1.03)
SQUAMOUS EPITHELIAL: 1 /HPF
TOTAL PROTEIN: 6.8 GM/DL (ref 6.4–8.2)
TRICHOMONAS: ABNORMAL /HPF
TROPONIN, HIGH SENSITIVITY: 134 NG/L (ref 0–14)
TROPONIN, HIGH SENSITIVITY: 142 NG/L (ref 0–14)
TROPONIN, HIGH SENSITIVITY: 142 NG/L (ref 0–14)
TSH SERPL DL<=0.005 MIU/L-ACNC: 3.21 UIU/ML (ref 0.27–4.2)
UROBILINOGEN, URINE: 0.2 MG/DL (ref 0.2–1)
WBC # BLD: 18.1 K/CU MM (ref 4–10.5)
WBC CLUMP: ABNORMAL /HPF
WBC UA: 218 /HPF (ref 0–5)

## 2023-11-10 PROCEDURE — 83880 ASSAY OF NATRIURETIC PEPTIDE: CPT

## 2023-11-10 PROCEDURE — 6360000002 HC RX W HCPCS: Performed by: EMERGENCY MEDICINE

## 2023-11-10 PROCEDURE — 87186 SC STD MICRODIL/AGAR DIL: CPT

## 2023-11-10 PROCEDURE — 85007 BL SMEAR W/DIFF WBC COUNT: CPT

## 2023-11-10 PROCEDURE — 87086 URINE CULTURE/COLONY COUNT: CPT

## 2023-11-10 PROCEDURE — 84484 ASSAY OF TROPONIN QUANT: CPT

## 2023-11-10 PROCEDURE — 51702 INSERT TEMP BLADDER CATH: CPT

## 2023-11-10 PROCEDURE — 83605 ASSAY OF LACTIC ACID: CPT

## 2023-11-10 PROCEDURE — 2500000003 HC RX 250 WO HCPCS: Performed by: EMERGENCY MEDICINE

## 2023-11-10 PROCEDURE — 80053 COMPREHEN METABOLIC PANEL: CPT

## 2023-11-10 PROCEDURE — 81001 URINALYSIS AUTO W/SCOPE: CPT

## 2023-11-10 PROCEDURE — 71045 X-RAY EXAM CHEST 1 VIEW: CPT

## 2023-11-10 PROCEDURE — 82805 BLOOD GASES W/O2 SATURATION: CPT

## 2023-11-10 PROCEDURE — 0202U NFCT DS 22 TRGT SARS-COV-2: CPT

## 2023-11-10 PROCEDURE — 6370000000 HC RX 637 (ALT 250 FOR IP): Performed by: PHYSICIAN ASSISTANT

## 2023-11-10 PROCEDURE — 85027 COMPLETE CBC AUTOMATED: CPT

## 2023-11-10 PROCEDURE — 93005 ELECTROCARDIOGRAM TRACING: CPT | Performed by: EMERGENCY MEDICINE

## 2023-11-10 PROCEDURE — 84145 PROCALCITONIN (PCT): CPT

## 2023-11-10 PROCEDURE — 96374 THER/PROPH/DIAG INJ IV PUSH: CPT

## 2023-11-10 PROCEDURE — 96375 TX/PRO/DX INJ NEW DRUG ADDON: CPT

## 2023-11-10 PROCEDURE — 99285 EMERGENCY DEPT VISIT HI MDM: CPT

## 2023-11-10 PROCEDURE — 87040 BLOOD CULTURE FOR BACTERIA: CPT

## 2023-11-10 PROCEDURE — 1200000000 HC SEMI PRIVATE

## 2023-11-10 PROCEDURE — 87077 CULTURE AEROBIC IDENTIFY: CPT

## 2023-11-10 PROCEDURE — 6360000002 HC RX W HCPCS: Performed by: PHYSICIAN ASSISTANT

## 2023-11-10 PROCEDURE — 2580000003 HC RX 258: Performed by: EMERGENCY MEDICINE

## 2023-11-10 PROCEDURE — 84443 ASSAY THYROID STIM HORMONE: CPT

## 2023-11-10 RX ORDER — AMLODIPINE BESYLATE 5 MG/1
2.5 TABLET ORAL DAILY
Status: DISCONTINUED | OUTPATIENT
Start: 2023-11-11 | End: 2023-11-13 | Stop reason: HOSPADM

## 2023-11-10 RX ORDER — FLUTICASONE PROPIONATE 50 MCG
1 SPRAY, SUSPENSION (ML) NASAL DAILY
Status: DISCONTINUED | OUTPATIENT
Start: 2023-11-10 | End: 2023-11-13 | Stop reason: HOSPADM

## 2023-11-10 RX ORDER — METOPROLOL TARTRATE 50 MG/1
50 TABLET, FILM COATED ORAL 2 TIMES DAILY
Status: DISCONTINUED | OUTPATIENT
Start: 2023-11-10 | End: 2023-11-13 | Stop reason: HOSPADM

## 2023-11-10 RX ORDER — LANOLIN ALCOHOL/MO/W.PET/CERES
3 CREAM (GRAM) TOPICAL NIGHTLY
COMMUNITY

## 2023-11-10 RX ORDER — IPRATROPIUM BROMIDE AND ALBUTEROL SULFATE 2.5; .5 MG/3ML; MG/3ML
1 SOLUTION RESPIRATORY (INHALATION) EVERY 4 HOURS PRN
Status: DISCONTINUED | OUTPATIENT
Start: 2023-11-10 | End: 2023-11-13 | Stop reason: HOSPADM

## 2023-11-10 RX ORDER — POLYETHYLENE GLYCOL 3350 17 G/17G
17 POWDER, FOR SOLUTION ORAL DAILY PRN
Status: DISCONTINUED | OUTPATIENT
Start: 2023-11-10 | End: 2023-11-13 | Stop reason: HOSPADM

## 2023-11-10 RX ORDER — PROMETHAZINE HYDROCHLORIDE 12.5 MG/1
12.5 TABLET ORAL EVERY 6 HOURS PRN
Status: DISCONTINUED | OUTPATIENT
Start: 2023-11-10 | End: 2023-11-13 | Stop reason: HOSPADM

## 2023-11-10 RX ORDER — PANTOPRAZOLE SODIUM 40 MG/1
40 TABLET, DELAYED RELEASE ORAL DAILY
Status: DISCONTINUED | OUTPATIENT
Start: 2023-11-11 | End: 2023-11-13 | Stop reason: HOSPADM

## 2023-11-10 RX ORDER — LISINOPRIL 5 MG/1
10 TABLET ORAL DAILY
Status: DISCONTINUED | OUTPATIENT
Start: 2023-11-11 | End: 2023-11-13 | Stop reason: HOSPADM

## 2023-11-10 RX ORDER — FERROUS SULFATE 325(65) MG
325 TABLET ORAL
Status: DISCONTINUED | OUTPATIENT
Start: 2023-11-11 | End: 2023-11-13 | Stop reason: HOSPADM

## 2023-11-10 RX ORDER — SODIUM CHLORIDE 0.9 % (FLUSH) 0.9 %
5-40 SYRINGE (ML) INJECTION PRN
Status: DISCONTINUED | OUTPATIENT
Start: 2023-11-10 | End: 2023-11-13 | Stop reason: HOSPADM

## 2023-11-10 RX ORDER — MONTELUKAST SODIUM 10 MG/1
10 TABLET ORAL DAILY
COMMUNITY

## 2023-11-10 RX ORDER — NITROGLYCERIN 0.4 MG/1
0.4 TABLET SUBLINGUAL EVERY 5 MIN PRN
Status: DISCONTINUED | OUTPATIENT
Start: 2023-11-10 | End: 2023-11-13 | Stop reason: HOSPADM

## 2023-11-10 RX ORDER — FUROSEMIDE 10 MG/ML
40 INJECTION INTRAMUSCULAR; INTRAVENOUS 2 TIMES DAILY
Status: COMPLETED | OUTPATIENT
Start: 2023-11-10 | End: 2023-11-12

## 2023-11-10 RX ORDER — LANOLIN ALCOHOL/MO/W.PET/CERES
500 CREAM (GRAM) TOPICAL DAILY
Status: DISCONTINUED | OUTPATIENT
Start: 2023-11-11 | End: 2023-11-13 | Stop reason: HOSPADM

## 2023-11-10 RX ORDER — ONDANSETRON 2 MG/ML
4 INJECTION INTRAMUSCULAR; INTRAVENOUS EVERY 6 HOURS PRN
Status: DISCONTINUED | OUTPATIENT
Start: 2023-11-10 | End: 2023-11-13 | Stop reason: HOSPADM

## 2023-11-10 RX ORDER — ACETAMINOPHEN 325 MG/1
650 TABLET ORAL EVERY 6 HOURS PRN
Status: DISCONTINUED | OUTPATIENT
Start: 2023-11-10 | End: 2023-11-13 | Stop reason: HOSPADM

## 2023-11-10 RX ORDER — ACETAMINOPHEN 650 MG/1
650 SUPPOSITORY RECTAL EVERY 6 HOURS PRN
Status: DISCONTINUED | OUTPATIENT
Start: 2023-11-10 | End: 2023-11-13 | Stop reason: HOSPADM

## 2023-11-10 RX ORDER — SODIUM CHLORIDE 9 MG/ML
INJECTION, SOLUTION INTRAVENOUS PRN
Status: DISCONTINUED | OUTPATIENT
Start: 2023-11-10 | End: 2023-11-13 | Stop reason: HOSPADM

## 2023-11-10 RX ORDER — SODIUM CHLORIDE 0.9 % (FLUSH) 0.9 %
5-40 SYRINGE (ML) INJECTION EVERY 12 HOURS SCHEDULED
Status: DISCONTINUED | OUTPATIENT
Start: 2023-11-10 | End: 2023-11-13 | Stop reason: HOSPADM

## 2023-11-10 RX ORDER — FUROSEMIDE 10 MG/ML
40 INJECTION INTRAMUSCULAR; INTRAVENOUS ONCE
Status: COMPLETED | OUTPATIENT
Start: 2023-11-10 | End: 2023-11-10

## 2023-11-10 RX ORDER — CETIRIZINE HYDROCHLORIDE 10 MG/1
10 TABLET ORAL DAILY
COMMUNITY

## 2023-11-10 RX ADMIN — FUROSEMIDE 40 MG: 10 INJECTION, SOLUTION INTRAMUSCULAR; INTRAVENOUS at 23:10

## 2023-11-10 RX ADMIN — APIXABAN 5 MG: 5 TABLET, FILM COATED ORAL at 21:07

## 2023-11-10 RX ADMIN — CEFTRIAXONE 1000 MG: 1 INJECTION, POWDER, FOR SOLUTION INTRAMUSCULAR; INTRAVENOUS at 06:52

## 2023-11-10 RX ADMIN — FUROSEMIDE 40 MG: 10 INJECTION, SOLUTION INTRAMUSCULAR; INTRAVENOUS at 06:50

## 2023-11-10 RX ADMIN — METOPROLOL TARTRATE 50 MG: 50 TABLET, FILM COATED ORAL at 21:07

## 2023-11-10 RX ADMIN — ACETAMINOPHEN 650 MG: 325 TABLET ORAL at 21:07

## 2023-11-10 RX ADMIN — DOXYCYCLINE 100 MG: 100 INJECTION, POWDER, LYOPHILIZED, FOR SOLUTION INTRAVENOUS at 06:53

## 2023-11-10 ASSESSMENT — PAIN SCALES - GENERAL: PAINLEVEL_OUTOF10: 8

## 2023-11-10 ASSESSMENT — PAIN DESCRIPTION - DESCRIPTORS: DESCRIPTORS: ACHING

## 2023-11-10 ASSESSMENT — PAIN DESCRIPTION - LOCATION: LOCATION: BACK

## 2023-11-10 ASSESSMENT — PAIN - FUNCTIONAL ASSESSMENT: PAIN_FUNCTIONAL_ASSESSMENT: ACTIVITIES ARE NOT PREVENTED

## 2023-11-10 ASSESSMENT — PAIN DESCRIPTION - ORIENTATION: ORIENTATION: LOWER

## 2023-11-10 NOTE — ED PROVIDER NOTES
[address] ENCOUNTER      Pt Name: Leighann Alcantar  MRN: [id number]  [address] [facility] 3/7/1930  Date of evaluation: 11/10/2023  Provider: Aida Bradley MD    CHIEF COMPLAINT       Chief Complaint   Patient presents with    Shortness of Breath         HISTORY OF PRESENT ILLNESS      Leighann Alcantar is a 80 y.o. female who presents to the emergency department  for   Chief Complaint   Patient presents with    Shortness of Breath       80year-old female presents in respiratory distress. She is coming from a facility. She has a history of cardiomyopathy and CHF. She is a bit of a difficult historian. We did obtain some collateral information from her facility. Reportedly she has been progressively short of breath over the past couple of days in facility. She had been given several doses of diuretic at her facility. Over this morning her shortness of breath worsened. She reported became hypoxic in the 70s on room air. She does not have a history of chronic lung condition or supplemental oxygen requirement at baseline. She was put on several liters of supplemental oxygen by her facility. EMS was called to scene. EMS reports by the time they got that she was on about 5 L and had saturations in the low 80s. They did place a nonrebreather with improvement in her saturations. She is brought to the emergency department for further evaluation. Upon presentation emergency department, she is moving all extremities spontaneously. She is answering questions. No report that she is any remarkable infectious symptoms. No report that she had a fall, trauma or injury. We were able to de-escalate her from the nonrebreather back to nasal cannula and she did hold her sats well. Nursing Notes, Triage Notes & Vital Signs were reviewed.       REVIEW OF SYSTEMS    (2-9 systems for level 4, 10 or more for level 5)     Review of

## 2023-11-10 NOTE — ED NOTES
1102 perfect serve message sent to Dr Jeannie Wang on in patient consult from hospitalist.     Padmini Fulton  11/10/23 1111    1120 Dr Jeannie Wang acknowledged perfect serve message.  Added to treatment team.     Padmini Fulton  11/10/23 1140

## 2023-11-10 NOTE — ED NOTES
Pt assisted into a chair. Breakfast in front of her and call light in reach.       Precious Clancy, WALDO  11/10/23 2046

## 2023-11-10 NOTE — H&P
History and Physical      Name:  Brian Romero /Age/Sex: 3/7/1930  (80 y.o. female)   MRN & CSN:  [id number] & [id number] Encounter Date/Time: 11/10/2023 12:21 PM EST   Location:  [facility] PCP: Max Ricardo MD       Hospital Day: 1    Assessment and Plan:     Brian Romero is a 80 y.o. female who presents with Acute on chronic systolic CHF (congestive heart failure) [facility]    Hospital Problems             Last Modified POA    * (Principal) Acute on chronic systolic CHF (congestive heart failure) ([address]) 11/10/2023 Yes       Medical decision making:  Acute on chronic CHF Exacerbation   Acutely decompensated. Elevated BNP. Troponin elevated. Bilateral lower extremity pitting edema present. Last echo ' EF 55%  Tele monitoring  Start Lasix IV 40 mg BID. Home dose is 20 mg daily  Continue with supplemental oxygen, currently on 5 L with no home oxygen baseline, de-escalate as able  Strict I/O and daily weights   Continue metoprolol and lisinopril  Given DNRCC status, will continue with above plan with no aggressive measures, however if clinically declines, could consider cardiology consult  Nonsustained ventricular tachycardia  Seen on telemetry for 9 beats, continue to monitor on telemetry  Hold off on consulting cardiology given CODE STATUS  Abnormal urinalysis  Patient is a poor historian but UA is positive   Started on rocephin, continue until cultures result  Blood and urine cultures pending  Coronary artery disease s/p PCI to LAD in   Follows outpatient with cardiology  Hyperlipidemia  Hypertension   Continue norvasc, lisionpril, metoprolol  GERD  Continue protonix  Restless leg sydrome   History of pericardiocentesis in    Iron deficient anemia  Continue iron  DVT   Continue eliquis    Disposition:   Current Living situation: SNF  Expected Disposition: SNF  Estimated D/C: several days    Diet ADULT DIET; Regular;  Low Sodium (2 gm)   DVT Prophylaxis [] Lovenox, []  Heparin, [] SCDs,

## 2023-11-10 NOTE — ED TRIAGE NOTES
Pt to ED via EMS from [facility] with c/o respiratory distress. Per EMS, pt was satting in the 70's on room air at the facility and they placed her on 5L NRB. Pt [address] at 100%. Pt was placed on 6L NC on arrival and is maintaing a sat of 95%. Per the facility, pt is a DNR-CC but paperwork was not sent with pt.

## 2023-11-10 NOTE — ED NOTES
A run of vtach was showed on the monitor. Pt not symptomatic and denies any chest pain. Dr.McQuillen bowen served and he will forward to [place]. EKG was completed.          Reza Vaughn RN  11/10/23 4297

## 2023-11-10 NOTE — ED NOTES
Medication History  [facility]    Patient Name: Cristina Pulliam 3/7/1930     Medication history has been completed by: Meghan Rincon CPhT    Source(s) of information: Medication list provided by [address] [place] [address]     Primary Care Physician: Dirk Augustin MD     Pharmacy:     Allergies as of 11/10/2023 - Fully Reviewed 11/10/2023   Allergen Reaction Noted    Ibuprofen Itching 01/12/2017    Antivert [meclizine hcl]  12/27/2011    Bactrim [sulfamethoxazole-trimethoprim]  06/21/2019    Codeine  12/27/2011    Erythromycin      Macrobid [nitrofurantoin macrocrystal]  10/02/2014    Meclizine  12/27/2011    Niacin and related  12/27/2011    Seldane [terfenadine]  12/27/2011    Sulfa antibiotics  05/04/2018    Tramadol Other (See Comments) 07/27/2017    Ultram [tramadol hcl]  12/27/2011    Aspirin Other (See Comments) 03/24/2013        Prior to Admission medications    Medication Sig Start Date End Date Taking?  Authorizing Provider   cetirizine (ZYRTEC) 10 MG tablet Take 1 tablet by mouth daily   Yes Marlene Street MD   melatonin 3 MG TABS tablet Take 1 tablet by mouth nightly   Yes Marlene Street MD   montelukast (SINGULAIR) 10 MG tablet Take 1 tablet by mouth daily   Yes Marlene Street MD   amLODIPine (NORVASC) 2.5 MG tablet  11/8/22   ProviderMarlene MD   alendronate (FOSAMAX) 70 MG tablet Take 1 tablet by mouth every 7 days Receives on Tuesday 9/28/21   Marlene Street MD   ondansetron (ZOFRAN) 4 MG tablet  9/18/21   Marlene Street MD   fluticasone [place]) 50 MCG/ACT nasal spray 1 spray by Each Nostril route daily 3/16/21   Dima Encarnacion MD   lisinopril (PRINIVIL;ZESTRIL) 10 MG tablet Take 1 tablet by mouth daily 3/15/21   Dima Encarnacion MD   guaiFENesin (ROBITUSSIN) 100 MG/5ML liquid Take 200 mg by mouth 4 times daily as needed for Cough    ProviderMarlene MD   metoprolol tartrate (LOPRESSOR) 50 MG tablet Take 1 tablet by

## 2023-11-10 NOTE — ED NOTES
Pt repositioned in bed. Bilateral legs propped onto pillows. Provided warm blanket and ice chips. Call light in reach.       Ozzie Gutierrez RN  11/10/23 3911

## 2023-11-10 NOTE — ED NOTES
ED TO INPATIENT SBAR HANDOFF    Patient Name: Tamika Lewis   :  3/7/1930  80 y.o. Preferred Name  Patria Palmer  Family/Caregiver Present no   Restraints no   C-SSRS:    Sitter no   Sepsis Risk Score Sepsis Risk Score: 11.18      Situation  Chief Complaint   Patient presents with    Shortness of Breath     Brief Description of Patient's Condition: Patient came in with shortness of breath  Mental Status: oriented, alert, coherent, logical, thought processes intact, and able to concentrate and follow conversation  Arrived from: nursing home    Imaging:   XR CHEST PORTABLE   Final Result   Enlargement of the cardiac silhouette with worsening vascular congestion and   diffuse interstitial edema. Trace left effusion.            Abnormal labs:   Abnormal Labs Reviewed   COMPREHENSIVE METABOLIC PANEL - Abnormal; Notable for the following components:       Result Value    Glucose 229 (*)     BUN 32 (*)     Est, Glom Filt Rate 53 (*)     All other components within normal limits   CBC WITH AUTO DIFFERENTIAL - Abnormal; Notable for the following components:    WBC 18.1 (*)     Hemoglobin 10.0 (*)     Hematocrit 34.6 (*)     MCH 23.8 (*)     MCHC 28.9 (*)     RDW 16.9 (*)     MPV 11.2 (*)     Bands Relative 4 (*)     Eosinophils % 4.0 (*)     All other components within normal limits   BLOOD GAS, VENOUS - Abnormal; Notable for the following components:    pH, Duran 7.30 (*)     pO2, Duran 157 (*)     Base Excess 3 (*)     O2 Sat, Duran 95.5 (*)     All other components within normal limits   BRAIN NATRIURETIC PEPTIDE - Abnormal; Notable for the following components:    Pro-BNP 6,804 (*)     All other components within normal limits   TROPONIN - Abnormal; Notable for the following components:    Troponin, High Sensitivity 142 (*)     All other components within normal limits   TROPONIN - Abnormal; Notable for the following components:    Troponin, High Sensitivity 142 (*)     All other components within normal limits

## 2023-11-11 LAB
ANION GAP SERPL CALCULATED.3IONS-SCNC: 12 MMOL/L (ref 4–16)
BUN SERPL-MCNC: 24 MG/DL (ref 6–23)
CALCIUM SERPL-MCNC: 9.2 MG/DL (ref 8.3–10.6)
CHLORIDE BLD-SCNC: 105 MMOL/L (ref 99–110)
CHOLEST SERPL-MCNC: 189 MG/DL
CO2: 26 MMOL/L (ref 21–32)
CREAT SERPL-MCNC: 0.8 MG/DL (ref 0.6–1.1)
EKG ATRIAL RATE: 101 BPM
EKG ATRIAL RATE: 85 BPM
EKG DIAGNOSIS: NORMAL
EKG DIAGNOSIS: NORMAL
EKG P AXIS: 43 DEGREES
EKG P AXIS: 47 DEGREES
EKG P-R INTERVAL: 178 MS
EKG P-R INTERVAL: 182 MS
EKG Q-T INTERVAL: 356 MS
EKG Q-T INTERVAL: 386 MS
EKG QRS DURATION: 120 MS
EKG QRS DURATION: 122 MS
EKG QTC CALCULATION (BAZETT): 459 MS
EKG QTC CALCULATION (BAZETT): 461 MS
EKG R AXIS: -16 DEGREES
EKG R AXIS: -26 DEGREES
EKG T AXIS: 117 DEGREES
EKG T AXIS: 129 DEGREES
EKG VENTRICULAR RATE: 101 BPM
EKG VENTRICULAR RATE: 85 BPM
GFR SERPL CREATININE-BSD FRML MDRD: >60 ML/MIN/1.73M2
GLUCOSE SERPL-MCNC: 125 MG/DL (ref 70–99)
HDLC SERPL-MCNC: 32 MG/DL
LDLC SERPL CALC-MCNC: 117 MG/DL
MAGNESIUM: 1.9 MG/DL (ref 1.8–2.4)
POTASSIUM SERPL-SCNC: 4.2 MMOL/L (ref 3.5–5.1)
SODIUM BLD-SCNC: 143 MMOL/L (ref 135–145)
TRIGL SERPL-MCNC: 202 MG/DL

## 2023-11-11 PROCEDURE — 6370000000 HC RX 637 (ALT 250 FOR IP): Performed by: PHYSICIAN ASSISTANT

## 2023-11-11 PROCEDURE — 6360000002 HC RX W HCPCS: Performed by: PHYSICIAN ASSISTANT

## 2023-11-11 PROCEDURE — 83735 ASSAY OF MAGNESIUM: CPT

## 2023-11-11 PROCEDURE — 94761 N-INVAS EAR/PLS OXIMETRY MLT: CPT

## 2023-11-11 PROCEDURE — 2700000000 HC OXYGEN THERAPY PER DAY

## 2023-11-11 PROCEDURE — 1200000000 HC SEMI PRIVATE

## 2023-11-11 PROCEDURE — 80048 BASIC METABOLIC PNL TOTAL CA: CPT

## 2023-11-11 PROCEDURE — 93010 ELECTROCARDIOGRAM REPORT: CPT | Performed by: INTERNAL MEDICINE

## 2023-11-11 PROCEDURE — 2580000003 HC RX 258: Performed by: PHYSICIAN ASSISTANT

## 2023-11-11 PROCEDURE — 36415 COLL VENOUS BLD VENIPUNCTURE: CPT

## 2023-11-11 PROCEDURE — 80061 LIPID PANEL: CPT

## 2023-11-11 PROCEDURE — 76937 US GUIDE VASCULAR ACCESS: CPT

## 2023-11-11 RX ADMIN — FUROSEMIDE 40 MG: 10 INJECTION, SOLUTION INTRAMUSCULAR; INTRAVENOUS at 17:14

## 2023-11-11 RX ADMIN — PANTOPRAZOLE SODIUM 40 MG: 40 TABLET, DELAYED RELEASE ORAL at 09:32

## 2023-11-11 RX ADMIN — SODIUM CHLORIDE, PRESERVATIVE FREE 10 ML: 5 INJECTION INTRAVENOUS at 09:38

## 2023-11-11 RX ADMIN — APIXABAN 5 MG: 5 TABLET, FILM COATED ORAL at 22:14

## 2023-11-11 RX ADMIN — FUROSEMIDE 40 MG: 10 INJECTION, SOLUTION INTRAMUSCULAR; INTRAVENOUS at 09:37

## 2023-11-11 RX ADMIN — LISINOPRIL 10 MG: 5 TABLET ORAL at 09:32

## 2023-11-11 RX ADMIN — AMLODIPINE BESYLATE 2.5 MG: 5 TABLET ORAL at 09:33

## 2023-11-11 RX ADMIN — CEFTRIAXONE 1000 MG: 1 INJECTION, POWDER, FOR SOLUTION INTRAMUSCULAR; INTRAVENOUS at 00:18

## 2023-11-11 RX ADMIN — FLUTICASONE PROPIONATE 1 SPRAY: 50 SPRAY, METERED NASAL at 13:41

## 2023-11-11 RX ADMIN — CYANOCOBALAMIN TAB 1000 MCG 500 MCG: 1000 TAB at 09:34

## 2023-11-11 RX ADMIN — APIXABAN 5 MG: 5 TABLET, FILM COATED ORAL at 09:32

## 2023-11-11 RX ADMIN — METOPROLOL TARTRATE 50 MG: 50 TABLET, FILM COATED ORAL at 09:34

## 2023-11-11 RX ADMIN — FERROUS SULFATE TAB 325 MG (65 MG ELEMENTAL FE) 325 MG: 325 (65 FE) TAB at 09:32

## 2023-11-11 RX ADMIN — SODIUM CHLORIDE, PRESERVATIVE FREE 10 ML: 5 INJECTION INTRAVENOUS at 22:17

## 2023-11-11 RX ADMIN — METOPROLOL TARTRATE 50 MG: 50 TABLET, FILM COATED ORAL at 22:14

## 2023-11-11 ASSESSMENT — PAIN SCALES - GENERAL: PAINLEVEL_OUTOF10: 0

## 2023-11-11 NOTE — CONSULTS
Consult completed. Daysi Nolasco 1.75\" peripheral IV initiated into right forearm x 1 attempt using ultrasound guided technique. Brisk blood return, flushes without resistance. Patient tolerated well. Primary nurse notified. Consult the Vascular Access Team for questions, concerns, or change in patient's condition.

## 2023-11-11 NOTE — CONSULTS
Comprehensive Nutrition Assessment    Type and Reason for Visit:  Initial, Consult    Nutrition Assessment:    RD consulted for Heart failure nutrition education, pt resides at [facility] and is placed on low sodium diet. Pt with advanced age, [place], and follows low sodium diet, diet ed is not appropriate. Spoke with pt and dtr at bedside, pt skipped breakfast, dtr reports pt tends to be a picky eater. Has no questions over low sodium diet. Will offer oral nutrition supplements to encourage good nutrition within stay as pt sometimes will skip meals. Follow as per protocol.     Letitia Chao RD, LD  Contact: [phone]

## 2023-11-12 LAB
CULTURE: ABNORMAL
CULTURE: ABNORMAL
Lab: ABNORMAL
SPECIMEN: ABNORMAL

## 2023-11-12 PROCEDURE — 6370000000 HC RX 637 (ALT 250 FOR IP): Performed by: PHYSICIAN ASSISTANT

## 2023-11-12 PROCEDURE — 94618 PULMONARY STRESS TESTING: CPT

## 2023-11-12 PROCEDURE — 2700000000 HC OXYGEN THERAPY PER DAY

## 2023-11-12 PROCEDURE — 83735 ASSAY OF MAGNESIUM: CPT

## 2023-11-12 PROCEDURE — 6360000002 HC RX W HCPCS: Performed by: STUDENT IN AN ORGANIZED HEALTH CARE EDUCATION/TRAINING PROGRAM

## 2023-11-12 PROCEDURE — 6360000002 HC RX W HCPCS: Performed by: PHYSICIAN ASSISTANT

## 2023-11-12 PROCEDURE — 1200000000 HC SEMI PRIVATE

## 2023-11-12 PROCEDURE — 94761 N-INVAS EAR/PLS OXIMETRY MLT: CPT

## 2023-11-12 PROCEDURE — 94762 N-INVAS EAR/PLS OXIMTRY CONT: CPT

## 2023-11-12 PROCEDURE — 2580000003 HC RX 258: Performed by: PHYSICIAN ASSISTANT

## 2023-11-12 PROCEDURE — 80048 BASIC METABOLIC PNL TOTAL CA: CPT

## 2023-11-12 RX ORDER — CEFDINIR 300 MG/1
300 CAPSULE ORAL EVERY 12 HOURS SCHEDULED
Status: DISCONTINUED | OUTPATIENT
Start: 2023-11-13 | End: 2023-11-13 | Stop reason: HOSPADM

## 2023-11-12 RX ORDER — FUROSEMIDE 20 MG/1
20 TABLET ORAL DAILY
Status: DISCONTINUED | OUTPATIENT
Start: 2023-11-13 | End: 2023-11-13 | Stop reason: HOSPADM

## 2023-11-12 RX ADMIN — FUROSEMIDE 40 MG: 10 INJECTION, SOLUTION INTRAMUSCULAR; INTRAVENOUS at 17:44

## 2023-11-12 RX ADMIN — METOPROLOL TARTRATE 50 MG: 50 TABLET, FILM COATED ORAL at 22:22

## 2023-11-12 RX ADMIN — APIXABAN 5 MG: 5 TABLET, FILM COATED ORAL at 22:21

## 2023-11-12 RX ADMIN — CYANOCOBALAMIN TAB 1000 MCG 500 MCG: 1000 TAB at 09:56

## 2023-11-12 RX ADMIN — SODIUM CHLORIDE: 9 INJECTION, SOLUTION INTRAVENOUS at 00:21

## 2023-11-12 RX ADMIN — FERROUS SULFATE TAB 325 MG (65 MG ELEMENTAL FE) 325 MG: 325 (65 FE) TAB at 09:55

## 2023-11-12 RX ADMIN — SODIUM CHLORIDE, PRESERVATIVE FREE 10 ML: 5 INJECTION INTRAVENOUS at 20:17

## 2023-11-12 RX ADMIN — APIXABAN 5 MG: 5 TABLET, FILM COATED ORAL at 09:56

## 2023-11-12 RX ADMIN — SODIUM CHLORIDE, PRESERVATIVE FREE 20 ML: 5 INJECTION INTRAVENOUS at 09:56

## 2023-11-12 RX ADMIN — LISINOPRIL 10 MG: 5 TABLET ORAL at 09:54

## 2023-11-12 RX ADMIN — FLUTICASONE PROPIONATE 1 SPRAY: 50 SPRAY, METERED NASAL at 09:57

## 2023-11-12 RX ADMIN — PANTOPRAZOLE SODIUM 40 MG: 40 TABLET, DELAYED RELEASE ORAL at 09:56

## 2023-11-12 RX ADMIN — CEFTRIAXONE 1000 MG: 1 INJECTION, POWDER, FOR SOLUTION INTRAMUSCULAR; INTRAVENOUS at 00:20

## 2023-11-12 RX ADMIN — FUROSEMIDE 40 MG: 10 INJECTION, SOLUTION INTRAMUSCULAR; INTRAVENOUS at 09:56

## 2023-11-12 RX ADMIN — POLYETHYLENE GLYCOL (3350) 17 G: 17 POWDER, FOR SOLUTION ORAL at 09:57

## 2023-11-12 RX ADMIN — METOPROLOL TARTRATE 50 MG: 50 TABLET, FILM COATED ORAL at 09:55

## 2023-11-12 RX ADMIN — AMLODIPINE BESYLATE 2.5 MG: 5 TABLET ORAL at 09:56

## 2023-11-13 VITALS
TEMPERATURE: 97.7 F | SYSTOLIC BLOOD PRESSURE: 90 MMHG | HEART RATE: 131 BPM | DIASTOLIC BLOOD PRESSURE: 57 MMHG | OXYGEN SATURATION: 90 % | WEIGHT: 149.91 LBS | RESPIRATION RATE: 20 BRPM | BODY MASS INDEX: 30.28 KG/M2

## 2023-11-13 LAB
ANION GAP SERPL CALCULATED.3IONS-SCNC: 11 MMOL/L (ref 4–16)
BUN SERPL-MCNC: 37 MG/DL (ref 6–23)
CALCIUM SERPL-MCNC: 9.5 MG/DL (ref 8.3–10.6)
CHLORIDE BLD-SCNC: 100 MMOL/L (ref 99–110)
CO2: 27 MMOL/L (ref 21–32)
CREAT SERPL-MCNC: 1 MG/DL (ref 0.6–1.1)
EKG ATRIAL RATE: 68 BPM
EKG DIAGNOSIS: NORMAL
EKG P AXIS: 40 DEGREES
EKG P-R INTERVAL: 204 MS
EKG Q-T INTERVAL: 410 MS
EKG QRS DURATION: 118 MS
EKG QTC CALCULATION (BAZETT): 435 MS
EKG R AXIS: -25 DEGREES
EKG T AXIS: 156 DEGREES
EKG VENTRICULAR RATE: 68 BPM
GFR SERPL CREATININE-BSD FRML MDRD: 53 ML/MIN/1.73M2
GLUCOSE SERPL-MCNC: 115 MG/DL (ref 70–99)
MAGNESIUM: 2 MG/DL (ref 1.8–2.4)
POTASSIUM SERPL-SCNC: 3.7 MMOL/L (ref 3.5–5.1)
PRO-BNP: 3106 PG/ML
SODIUM BLD-SCNC: 138 MMOL/L (ref 135–145)

## 2023-11-13 PROCEDURE — 80048 BASIC METABOLIC PNL TOTAL CA: CPT

## 2023-11-13 PROCEDURE — 6370000000 HC RX 637 (ALT 250 FOR IP): Performed by: PHYSICIAN ASSISTANT

## 2023-11-13 PROCEDURE — 36415 COLL VENOUS BLD VENIPUNCTURE: CPT

## 2023-11-13 PROCEDURE — 93005 ELECTROCARDIOGRAM TRACING: CPT | Performed by: STUDENT IN AN ORGANIZED HEALTH CARE EDUCATION/TRAINING PROGRAM

## 2023-11-13 PROCEDURE — 83880 ASSAY OF NATRIURETIC PEPTIDE: CPT

## 2023-11-13 PROCEDURE — 2580000003 HC RX 258: Performed by: PHYSICIAN ASSISTANT

## 2023-11-13 PROCEDURE — 83735 ASSAY OF MAGNESIUM: CPT

## 2023-11-13 PROCEDURE — 6370000000 HC RX 637 (ALT 250 FOR IP): Performed by: STUDENT IN AN ORGANIZED HEALTH CARE EDUCATION/TRAINING PROGRAM

## 2023-11-13 PROCEDURE — 94761 N-INVAS EAR/PLS OXIMETRY MLT: CPT

## 2023-11-13 PROCEDURE — 93010 ELECTROCARDIOGRAM REPORT: CPT | Performed by: INTERNAL MEDICINE

## 2023-11-13 RX ORDER — MIDODRINE HYDROCHLORIDE 5 MG/1
5 TABLET ORAL
Status: DISCONTINUED | OUTPATIENT
Start: 2023-11-13 | End: 2023-11-13 | Stop reason: HOSPADM

## 2023-11-13 RX ORDER — CEFDINIR 300 MG/1
300 CAPSULE ORAL EVERY 12 HOURS SCHEDULED
Qty: 3 CAPSULE | Refills: 0 | Status: SHIPPED | OUTPATIENT
Start: 2023-11-13 | End: 2023-11-15

## 2023-11-13 RX ORDER — MIDODRINE HYDROCHLORIDE 5 MG/1
5 TABLET ORAL
Qty: 90 TABLET | Refills: 3 | Status: SHIPPED | OUTPATIENT
Start: 2023-11-13

## 2023-11-13 RX ADMIN — MIDODRINE HYDROCHLORIDE 5 MG: 5 TABLET ORAL at 09:37

## 2023-11-13 RX ADMIN — CEFDINIR 300 MG: 300 CAPSULE ORAL at 09:37

## 2023-11-13 RX ADMIN — APIXABAN 5 MG: 5 TABLET, FILM COATED ORAL at 09:37

## 2023-11-13 RX ADMIN — FLUTICASONE PROPIONATE 1 SPRAY: 50 SPRAY, METERED NASAL at 09:37

## 2023-11-13 RX ADMIN — METOPROLOL TARTRATE 50 MG: 50 TABLET, FILM COATED ORAL at 09:37

## 2023-11-13 RX ADMIN — SODIUM CHLORIDE, PRESERVATIVE FREE 10 ML: 5 INJECTION INTRAVENOUS at 09:39

## 2023-11-13 RX ADMIN — PANTOPRAZOLE SODIUM 40 MG: 40 TABLET, DELAYED RELEASE ORAL at 09:37

## 2023-11-13 RX ADMIN — CYANOCOBALAMIN TAB 1000 MCG 500 MCG: 1000 TAB at 09:37

## 2023-11-13 RX ADMIN — FERROUS SULFATE TAB 325 MG (65 MG ELEMENTAL FE) 325 MG: 325 (65 FE) TAB at 09:37

## 2023-11-13 ASSESSMENT — PAIN SCALES - GENERAL
PAINLEVEL_OUTOF10: 0
PAINLEVEL_OUTOF10: 0

## 2023-11-13 NOTE — DISCHARGE INSTR - COC
Continuity of Care Form    Patient Name: Abena Sosa   :  3/7/1930  MRN:  [id number]    Admit date:  11/10/2023  Discharge date:  ***    Code Status Order: DNR-CC   Advance Directives:     Admitting Physician:  Hayden Galarza MD  PCP: Awilda Mcarthur MD    Discharging Nurse: [facility] Unit/Room#: 0651/0777-F  Discharging Unit Phone Number: ***    Emergency Contact:   Extended Emergency Contact Information  Primary Emergency Contact: Brian Johnson  Address: [address] [place] [address], [address] [place] [address] of [address] Joni Leal Phone: [phone]  Mobile Phone: [phone]  Relation: Child    Past Surgical History:  Past Surgical History:   Procedure Laterality Date    CATARACT REMOVAL      b/l    CORONARY ANGIOPLASTY WITH STENT PLACEMENT      stent to LAD    HYSTERECTOMY (CERVIX STATUS UNKNOWN)      PERICARDIUM SURGERY  2006    pericardial window and pericardiocentesis       Immunization History:   Immunization History   Administered Date(s) Administered    COVID-19, PFIZER Bivalent, DO NOT Dilute, (age 12y+), IM, 30 mcg/0.3 mL 2022    COVID-19, PFIZER GRAY top, DO NOT Dilute, (age 15 y+), IM, 30 mcg/0.3 mL 2022    COVID-19, PFIZER PURPLE top, DILUTE for use, (age 15 y+), 30mcg/0.3mL 2021, 10/05/2021    Influenza Vaccine, unspecified formulation 10/01/2016    Pneumococcal, PCV-13, PREVNAR 15, (age 6w+), IM, 0.5mL 10/21/2015, 06/15/2017    TDaP, ADACEL (age 6y-58y), BOOSTRIX (age 10y+), IM, 0.5mL 2017    Zoster Live (Zostavax) 2014       Active Problems:  Patient Active Problem List   Diagnosis Code    Hypertension I10    CAD (coronary artery disease) I25.10    Orthostatic hypotension I95.1    Angina pectoris ([facility]) I20.9    Aortic regurgitation I35.1    S/P pericardiocentesis Z98.890    Headache R51.9    MI, old I25.2    CHF (congestive heart failure) ([facility]) I50.9    Claudication ([facility]) I73.9    Low back pain without sciatica M54.50

## 2023-11-13 NOTE — CARE COORDINATION
Reviewed chart, discussed in IDR, and plan is to return to [facility] [address] today. Spoke with Gene Nascimento in Admission for [address] they can take pt anytime. PS to Dr Kari Lim. 1545 Pt on discharge back to [facility] up with [facility] for 1700. Called and updated Daughter, she had questions , PS to Dr Kari Lim to call her. Called Ana Paula in admission at [address]. Also udpated Carole Cantu in nursing.

## 2023-11-15 LAB
CULTURE: NORMAL
CULTURE: NORMAL
Lab: NORMAL
Lab: NORMAL
SPECIMEN: NORMAL
SPECIMEN: NORMAL

## 2023-11-20 ENCOUNTER — OFFICE VISIT (OUTPATIENT)
Dept: CARDIOLOGY CLINIC | Age: 88
End: 2023-11-20
Payer: MEDICARE

## 2023-11-20 VITALS
BODY MASS INDEX: 30.04 KG/M2 | DIASTOLIC BLOOD PRESSURE: 62 MMHG | HEIGHT: 59 IN | HEART RATE: 70 BPM | SYSTOLIC BLOOD PRESSURE: 104 MMHG | OXYGEN SATURATION: 95 % | WEIGHT: 149 LBS

## 2023-11-20 DIAGNOSIS — R06.02 SOBOE (SHORTNESS OF BREATH ON EXERTION): Primary | ICD-10-CM

## 2023-11-20 PROCEDURE — G8484 FLU IMMUNIZE NO ADMIN: HCPCS | Performed by: INTERNAL MEDICINE

## 2023-11-20 PROCEDURE — 1036F TOBACCO NON-USER: CPT | Performed by: INTERNAL MEDICINE

## 2023-11-20 PROCEDURE — 99214 OFFICE O/P EST MOD 30 MIN: CPT | Performed by: INTERNAL MEDICINE

## 2023-11-20 PROCEDURE — 1090F PRES/ABSN URINE INCON ASSESS: CPT | Performed by: INTERNAL MEDICINE

## 2023-11-20 PROCEDURE — 1111F DSCHRG MED/CURRENT MED MERGE: CPT | Performed by: INTERNAL MEDICINE

## 2023-11-20 PROCEDURE — G8417 CALC BMI ABV UP PARAM F/U: HCPCS | Performed by: INTERNAL MEDICINE

## 2023-11-20 PROCEDURE — G8427 DOCREV CUR MEDS BY ELIG CLIN: HCPCS | Performed by: INTERNAL MEDICINE

## 2023-11-20 PROCEDURE — 1123F ACP DISCUSS/DSCN MKR DOCD: CPT | Performed by: INTERNAL MEDICINE

## 2023-11-20 NOTE — PATIENT INSTRUCTIONS
**It is YOUR responsibilty to bring medication bottles and/or updated medication list to [address] [place] [address]. This will allow us to better serve you and all your healthcare needs**   [facility] Laboratory Locations - No appointment necessary. Sites open Monday to Friday. Call your preferred location for test preparation, business   hours and other information you need. [employer] accepts [employer]. [address] [place] [address]. [address] [place], [facility]  Phone: [phone]    Thank you for allowing us to care for you today! We want to ensure we can follow your treatment plan and we strive to give you the best outcomes and experience possible. If you ever have a life threatening emergency and call 911 - for an ambulance (EMS)   Our providers can only care for you at:   [facility] or [facility]. Even if you have someone take you or you drive yourself we can only care for you in a [facility]. Our providers are not setup at the other healthcare locations! Please be informed that if you contact our office outside of normal business hours the physician on call cannot help with any scheduling or rescheduling issues, procedure instruction questions or any type of medication issue. We advise you for any urgent/emergency that you go to the nearest emergency room! PLEASE CALL OUR OFFICE DURING NORMAL BUSINESS HOURS    Monday - Friday   8 am to 5 pm    [place]: [address] [place]: [phone]    [place]:  [phone]  We are committed to providing you the best care possible. If you receive a survey after visiting one of our offices, please take time to share your experience concerning your physician office visit. These surveys are confidential and no health information about you is shared. We are eager to improve for you and we are counting on your feedback to help make that happen.

## 2023-11-20 NOTE — PROGRESS NOTES
CARDIOLOGY NOTE      11/20/2023    RE: Drea Boyle  (3/7/1930)                               TO:  Dr. Nayeli Rodriguez MD            Trav Washington is a 80 y.o. female who was seen today for management of  cad                                  Was in hospital with SOB  HPI:                   The patient does not have cardiac complaints  Patient also seen  for    - Coronary artery disease, does not have chest pain. Patient is  compliant with prescribed medicines.    - Hypertension,is  well controlled, pt is  compliant with medicines  - Hyperlipidimea, importance of hyperlipidimea discussed with pt.   - VHD mild SOB    Drea Boyle has the following history recorded in care path:  Patient Active Problem List    Diagnosis Date Noted    Acute on chronic systolic CHF (congestive heart failure) (HCC) 11/10/2023    Chest pain 03/14/2021    SVT (supraventricular tachycardia) 08/14/2020    DVT (deep vein thrombosis) in pregnancy 08/14/2020    Swelling of extremity 05/04/2018    Low back pain without sciatica 04/27/2016    Claudication ([address]) 04/17/2014    MI, old     CHF (congestive heart failure) ([address])     Headache 07/16/2013    Hypertension     CAD (coronary artery disease)     Orthostatic hypotension     Angina pectoris (HCC)     Aortic regurgitation     S/P pericardiocentesis      Current Outpatient Medications   Medication Sig Dispense Refill    midodrine (PROAMATINE) 5 MG tablet Take 1 tablet by mouth 3 times daily (with meals) Hold if SBP>120 90 tablet 3    cetirizine (ZYRTEC) 10 MG tablet Take 1 tablet by mouth daily      melatonin 3 MG TABS tablet Take 1 tablet by mouth nightly      montelukast (SINGULAIR) 10 MG tablet Take 1 tablet by mouth daily      alendronate (FOSAMAX) 70 MG tablet Take 1 tablet by mouth every 7 days Receives on Tuesday      ondansetron (ZOFRAN) 4 MG tablet       fluticasone (FLONASE) 50 MCG/ACT nasal spray 1 spray by Each Nostril route daily 1 Bottle 3

## 2023-11-21 ENCOUNTER — TELEPHONE (OUTPATIENT)
Dept: CARDIOLOGY CLINIC | Age: 88
End: 2023-11-21

## 2023-11-21 NOTE — TELEPHONE ENCOUNTER
Left Ventricle: Low normal left ventricular systolic function with a visually estimated EF of 50 - 55%. Left ventricle size is normal. Moderately increased wall thickness. Normal wall motion. Diastolic dysfunction present with normal LV EF. Aortic Valve: Mildly calcified left, right and noncoronary cusps. Moderate to severe regurgitation,  ms. Mild stenosis of the aortic valve. AV mean gradient is 11 mmHg. AV area by continuity VTI is 2.0 cm2. Mitral Valve: Mildly calcified leaflet. Mild regurgitation. Tricuspid Valve: Mild regurgitation. Mild to moderately elevated RVSP. The estimated RVSP is 46 mmHg. Pericardium: No pericardial effusion. Left message for patients daughter Renetta Arce to return my call regarding Echo results.

## 2023-12-06 ENCOUNTER — TELEPHONE (OUTPATIENT)
Dept: CARDIOLOGY CLINIC | Age: 88
End: 2023-12-06

## 2023-12-06 NOTE — PROGRESS NOTES
EKG strips noted from stress test: noted to have atrial fibrillation- she is on eliquis 5 mg bid   D/t advanced age decrease to 2.5 mg bid

## 2023-12-06 NOTE — PROGRESS NOTES
Stress test reviewed : EF noted to be diminished-most likely underestimated in the setting of atrial fib   Echo on 11/2023 EF 50-55%

## 2023-12-06 NOTE — TELEPHONE ENCOUNTER
Left message for daughter to call. Patient to start 2.5 mg Eliquis.  [facility] notified to reduce eliquis to 2.5mg.

## 2023-12-07 ENCOUNTER — TELEPHONE (OUTPATIENT)
Dept: CARDIOLOGY CLINIC | Age: 88
End: 2023-12-07

## 2023-12-07 NOTE — TELEPHONE ENCOUNTER
Spoke to patients daughter Julio Rowland regarding NM results, advised to make sure Abhishekharriet Ana keeps the f/u on 1/9/2024 with Dr. Marissa Cooks. Stress Test: A pharmacological stress test was performed using lexiscan. 50 mg of aminophylline given as a reversal agent at minute 2 of recovery. Blood pressure demonstrated a normal response and heart rate demonstrated an exaggerated response to stress.  The patient's heart rate recovery was abnormal.    Patient was noted to be in atrial fibrillation and was notified regarding that    Cardiolite study demonstrates an area of absent tracer uptake of the inferior to apical wall which is noted both on the stress and resting images set him lateral and the anterior wall shows normal tracer uptake both on the stress and resting images,    Ejection fraction by gated study is 35% which is probably an underestimation suggest a MUGA study or an echocardiogram for further clarification    Cardiolite study demonstrates an apical artifact which is probably a technical artifact with rest of the myocardium showing normal tracer uptake in all perfusion, ejection fraction is low we will suggest an echocardiogram for further clarification

## 2023-12-09 ENCOUNTER — HOSPITAL ENCOUNTER (EMERGENCY)
Age: 88
Discharge: HOME | End: 2023-12-09
Attending: EMERGENCY MEDICINE
Payer: MEDICARE

## 2023-12-09 VITALS
SYSTOLIC BLOOD PRESSURE: 166 MMHG | DIASTOLIC BLOOD PRESSURE: 63 MMHG | TEMPERATURE: 97.7 F | RESPIRATION RATE: 20 BRPM | OXYGEN SATURATION: 92 % | HEART RATE: 91 BPM

## 2023-12-09 DIAGNOSIS — Z71.1 WORRIED WELL: Primary | ICD-10-CM

## 2023-12-09 PROCEDURE — 99283 EMERGENCY DEPT VISIT LOW MDM: CPT

## 2023-12-09 ASSESSMENT — PAIN - FUNCTIONAL ASSESSMENT: PAIN_FUNCTIONAL_ASSESSMENT: NONE - DENIES PAIN

## 2023-12-09 NOTE — ACP (ADVANCE CARE PLANNING)
Patient does not have any ACP documents/Medical Power of . LSW notes hospital will follow [place] Next of Kin hierarchy in the following descending order for priority:    Guardian  Spouse  Majority of adult Children  Parents  Majority of adult Siblings  Nearest Relative not described above    Per [place] Next of Kin hierarchy: Patients' adult child will be [address]

## 2023-12-09 NOTE — ED TRIAGE NOTES
Patient has seasonal allergies and they are causing her to be short of breath. Made the nursing home call the ambulance.

## 2023-12-09 NOTE — CARE COORDINATION
Patient possible readmission. Patient recently admitted 11/10/23 - 11/13/23 for congestive heart failure. Patient presents to ED today with C/O Shortness of Breath. No Acute Findings. Patient Discharged.

## 2023-12-09 NOTE — ED PROVIDER NOTES
100 MG/5ML liquid Take 10 mLs by mouth 4 times daily as needed for Cough      metoprolol tartrate (LOPRESSOR) 50 MG tablet Take 1 tablet by mouth 2 times daily 60 tablet 2    Multiple Vitamins-Minerals (PRESERVISION AREDS 2 PO) Take 1 tablet by mouth 2 times daily      ELIQUIS 5 MG TABS tablet 1 tablet 2 times daily      hydrocortisone (ANUSOL-HC) 25 MG suppository Place 1 suppository rectally 2 times daily as needed for Hemorrhoids      ipratropium-albuterol (DUONEB) 0.5-2.5 (3) MG/3ML SOLN nebulizer solution as needed      Humidifiers (COOL MIST HUMIDIFIER 0.8 GAL) MISC 1 Dose by Does not apply route nightly 1 each 0    aluminum & magnesium hydroxide-simethicone (MYLANTA) 400-400-40 MG/5ML SUSP Take 30 mLs by mouth every 6 hours as needed      vitamin D (CHOLECALCIFEROL) 25213 UNIT CAPS Take 1 capsule by mouth every 30 days      docusate sodium (COLACE) 100 MG capsule Take 1 capsule by mouth Daily      ferrous sulfate 325 (65 Fe) MG tablet Take 1 tablet by mouth daily (with breakfast)      furosemide (LASIX) 20 MG tablet Take 1 tablet by mouth daily      nystatin (MYCOSTATIN) 570950 UNIT/GM powder Apply topically every 12 hours as needed (yeast)      polyethyl glycol-propyl glycol 0.4-0.3 % (SYSTANE) 0.4-0.3 % ophthalmic solution Place 2 drops into both eyes as needed for Dry Eyes      lidocaine (LIDODERM) 5 % Place 1 patch onto the skin daily 12 hours on, 12 hours off. (Patient taking differently: Place 1 patch onto the skin daily 12 hours on, 12 hours off.  Apply to lower back) 30 patch 0    pantoprazole (PROTONIX) 40 MG tablet Take 1 tablet by mouth daily 90 tablet 1    ACETAMINOPHEN ER PO Take 500 mg by mouth every 6 hours as needed for Pain      vitamin B-12 (CYANOCOBALAMIN) 500 MCG tablet Take 1 tablet by mouth daily       Allergies   Allergen Reactions    Ibuprofen Itching    Antivert [Meclizine Hcl]     Bactrim [Sulfamethoxazole-Trimethoprim]     Codeine     Erythromycin     Macrobid [Nitrofurantoin

## 2023-12-09 NOTE — ED NOTES
1140 called [address] for BLS unit to transport patient returning back to [facility]. Spoke with Angela Morel at dispatch. ETA scheduled for 1230.      Yana Braga  12/09/23 1140

## 2024-03-27 ENCOUNTER — OFFICE VISIT (OUTPATIENT)
Dept: CARDIOLOGY CLINIC | Age: 89
End: 2024-03-27
Payer: MEDICARE

## 2024-03-27 VITALS
BODY MASS INDEX: 30.09 KG/M2 | DIASTOLIC BLOOD PRESSURE: 84 MMHG | SYSTOLIC BLOOD PRESSURE: 136 MMHG | HEART RATE: 65 BPM | HEIGHT: 59 IN | OXYGEN SATURATION: 95 %

## 2024-03-27 DIAGNOSIS — R06.02 SOBOE (SHORTNESS OF BREATH ON EXERTION): Primary | ICD-10-CM

## 2024-03-27 DIAGNOSIS — I82.401 ACUTE DEEP VEIN THROMBOSIS (DVT) OF RIGHT LOWER EXTREMITY, UNSPECIFIED VEIN (HCC): ICD-10-CM

## 2024-03-27 PROCEDURE — 1123F ACP DISCUSS/DSCN MKR DOCD: CPT | Performed by: INTERNAL MEDICINE

## 2024-03-27 PROCEDURE — 99214 OFFICE O/P EST MOD 30 MIN: CPT | Performed by: INTERNAL MEDICINE

## 2024-03-27 NOTE — PROGRESS NOTES
CARDIOLOGY NOTE      3/27/2024    RE: Marlene Snowden  (3/7/1930)                               TO:  Cheyanne Rosenbaum MD            CHIEF COMPLAINT   Marlene is a 89 y.o. female who was seen today for management of  cad                                  Here for fu  HPI:                   The patient does not have cardiac complaints  Patient also seen  for    - Coronary artery disease, does not have chest pain. Patient is  compliant with prescribed medicines.   - Hypertension,is  well controlled, pt is  compliant with medicines  - Hyperlipidimea, importance of hyperlipidimea discussed with pt.   - VHD mild SOB    Marlene Snowden has the following history recorded in care path:  Patient Active Problem List    Diagnosis Date Noted    Acute on chronic systolic CHF (congestive heart failure) ([facility]) 11/10/2023    Chest pain 03/14/2021    SVT (supraventricular tachycardia) 08/14/2020    DVT (deep vein thrombosis) in pregnancy 08/14/2020    Swelling of extremity 05/04/2018    Low back pain without sciatica 04/27/2016    Claudication ([facility]) 04/17/2014    MI, old     CHF (congestive heart failure) ([facility])     Headache 07/16/2013    Hypertension     CAD (coronary artery disease)     Orthostatic hypotension     Angina pectoris ([facility])     Aortic regurgitation     S/P pericardiocentesis      Current Outpatient Medications   Medication Sig Dispense Refill    midodrine (PROAMATINE) 5 MG tablet Take 1 tablet by mouth 3 times daily (with meals) Hold if SBP>120 90 tablet 3    cetirizine (ZYRTEC) 10 MG tablet Take 1 tablet by mouth daily      melatonin 3 MG TABS tablet Take 1 tablet by mouth nightly      montelukast (SINGULAIR) 10 MG tablet Take 1 tablet by mouth daily      amLODIPine (NORVASC) 2.5 MG tablet  (Patient not taking: Reported on 11/20/2023)      alendronate (FOSAMAX) 70 MG tablet Take 1 tablet by mouth every 7 days Receives on Tuesday      ondansetron (ZOFRAN) 4 MG tablet       fluticasone (FLONASE) 50

## 2024-04-15 ENCOUNTER — TELEPHONE (OUTPATIENT)
Dept: CARDIOLOGY CLINIC | Age: 89
End: 2024-04-15

## 2024-05-01 ENCOUNTER — TELEPHONE (OUTPATIENT)
Dept: CARDIOLOGY CLINIC | Age: 89
End: 2024-05-01

## 2024-05-01 NOTE — TELEPHONE ENCOUNTER
Results given to patient's nurse at [facility] they will advise the patient.       No evidence of thrombophlebitis or significant venous insufficiency in bilateral lower venous system.

## 2024-05-20 ENCOUNTER — OFFICE VISIT (OUTPATIENT)
Dept: CARDIOLOGY CLINIC | Age: 89
End: 2024-05-20
Payer: MEDICARE

## 2024-05-20 VITALS
SYSTOLIC BLOOD PRESSURE: 136 MMHG | BODY MASS INDEX: 30.04 KG/M2 | WEIGHT: 149 LBS | HEIGHT: 59 IN | HEART RATE: 74 BPM | OXYGEN SATURATION: 90 % | DIASTOLIC BLOOD PRESSURE: 64 MMHG

## 2024-05-20 DIAGNOSIS — R06.02 SOBOE (SHORTNESS OF BREATH ON EXERTION): Primary | ICD-10-CM

## 2024-05-20 PROCEDURE — 1123F ACP DISCUSS/DSCN MKR DOCD: CPT | Performed by: INTERNAL MEDICINE

## 2024-05-20 PROCEDURE — 99214 OFFICE O/P EST MOD 30 MIN: CPT | Performed by: INTERNAL MEDICINE

## 2024-05-20 NOTE — PATIENT INSTRUCTIONS
We are committed to providing you the best care possible.    If you receive a survey after visiting one of our offices, please take time to share your experience concerning your physician office visit.  These surveys are confidential and no health information about you is shared.    We are eager to improve for you and we are counting on your feedback to help make that happen.      **It is YOUR responsibilty to bring medication bottles and/or updated medication list to EACH APPOINTMENT. This will allow us to better serve you and all your healthcare needs**  Thank you for allowing us to care for you today!   We want to ensure we can follow your treatment plan and we strive to give you the best outcomes and experience possible.   If you ever have a life threatening emergency and call 911 - for an ambulance (EMS)   Our providers can only care for you at:   [facility] or [facility].   Even if you have someone take you or you drive yourself we can only care for you in a [facility] facility. Our providers are not setup at the other healthcare locations!   Please be informed that if you contact our office outside of normal business hours the physician on call cannot help with any scheduling or rescheduling issues, procedure instruction questions or any type of medication issue.    We advise you for any urgent/emergency that you go to the nearest emergency room!    PLEASE CALL OUR OFFICE DURING NORMAL BUSINESS HOURS    Monday - Friday   8 am to 5 pm    [place]: [phone]    [place]: [phone]    [place]:  [phone]

## 2024-05-20 NOTE — PROGRESS NOTES
CARDIOLOGY NOTE      5/20/2024    RE: Marlene Snowden  (3/7/1930)                               TO:  Cheyanne Rosenbaum MD            CHIEF COMPLAINT   Marlene is a 89 y.o. female who was seen today for management of  cad                                  Here for fu  on testing  HPI:                   The patient does not have cardiac complaints  Patient also seen  for    - Coronary artery disease, does not have chest pain. Patient is  compliant with prescribed medicines.   - Hypertension,is  well controlled, pt is  compliant with medicines  - Hyperlipidimea, importance of hyperlipidimea discussed with pt.   - VHD mild SOB    Marlene Snowden has the following history recorded in care path:  Patient Active Problem List    Diagnosis Date Noted    Acute on chronic systolic CHF (congestive heart failure) ([facility]) 11/10/2023    Chest pain 03/14/2021    SVT (supraventricular tachycardia) ([facility]) 08/14/2020    DVT (deep vein thrombosis) in pregnancy 08/14/2020    Swelling of extremity 05/04/2018    Low back pain without sciatica 04/27/2016    Claudication ([facility]) 04/17/2014    MI, old     CHF (congestive heart failure) ([facility])     Headache 07/16/2013    Hypertension     CAD (coronary artery disease)     Orthostatic hypotension     Angina pectoris ([facility])     Aortic regurgitation     S/P pericardiocentesis      Current Outpatient Medications   Medication Sig Dispense Refill    midodrine (PROAMATINE) 5 MG tablet Take 1 tablet by mouth 3 times daily (with meals) Hold if SBP>120 90 tablet 3    cetirizine (ZYRTEC) 10 MG tablet Take 1 tablet by mouth daily      melatonin 3 MG TABS tablet Take 1 tablet by mouth nightly      montelukast (SINGULAIR) 10 MG tablet Take 1 tablet by mouth daily      alendronate (FOSAMAX) 70 MG tablet Take 1 tablet by mouth every 7 days Receives on Tuesday      ondansetron (ZOFRAN) 4 MG tablet       fluticasone (FLONASE) 50 MCG/ACT nasal spray 1 spray by Each Nostril route daily 1 Bottle 3

## 2024-11-26 ENCOUNTER — OFFICE VISIT (OUTPATIENT)
Dept: CARDIOLOGY CLINIC | Age: 88
End: 2024-11-26
Payer: MEDICARE

## 2024-11-26 VITALS
OXYGEN SATURATION: 95 % | SYSTOLIC BLOOD PRESSURE: 122 MMHG | HEIGHT: 60 IN | DIASTOLIC BLOOD PRESSURE: 60 MMHG | HEART RATE: 64 BPM | BODY MASS INDEX: 29.1 KG/M2

## 2024-11-26 DIAGNOSIS — I10 HYPERTENSION, UNSPECIFIED TYPE: Primary | ICD-10-CM

## 2024-11-26 PROCEDURE — 99214 OFFICE O/P EST MOD 30 MIN: CPT | Performed by: INTERNAL MEDICINE

## 2024-11-26 PROCEDURE — 1159F MED LIST DOCD IN RCRD: CPT | Performed by: INTERNAL MEDICINE

## 2024-11-26 PROCEDURE — 1123F ACP DISCUSS/DSCN MKR DOCD: CPT | Performed by: INTERNAL MEDICINE

## 2024-11-26 NOTE — PROGRESS NOTES
CARDIOLOGY NOTE      11/26/2024    RE: Marlene Snowden  (3/7/1930)                               TO:  Cheyanne Rosenbaum MD            CHIEF COMPLAINT   Marlene is a 89 y.o. female who was seen today for management of  cad                                  Here for fu   HPI:                   The patient does not have cardiac complaints  Patient also seen  for    - Coronary artery disease, does not have chest pain. Patient is  compliant with prescribed medicines.   - Hypertension,is  well controlled, pt is  compliant with medicines  - Hyperlipidimea, importance of hyperlipidimea discussed with pt.   - VHD mild SOB    Marlene Snowden has the following history recorded in care path:  Patient Active Problem List    Diagnosis Date Noted    Acute on chronic systolic CHF (congestive heart failure) ([facility]) 11/10/2023    Chest pain 03/14/2021    SVT (supraventricular tachycardia) ([facility]) 08/14/2020    DVT (deep vein thrombosis) in pregnancy 08/14/2020    Swelling of extremity 05/04/2018    Low back pain without sciatica 04/27/2016    Claudication ([facility]) 04/17/2014    MI, old     CHF (congestive heart failure) ([facility])     Headache 07/16/2013    Hypertension     CAD (coronary artery disease)     Orthostatic hypotension     Angina pectoris ([facility])     Aortic regurgitation     S/P pericardiocentesis      Current Outpatient Medications   Medication Sig Dispense Refill    midodrine (PROAMATINE) 5 MG tablet Take 1 tablet by mouth 3 times daily (with meals) Hold if SBP>120 90 tablet 3    cetirizine (ZYRTEC) 10 MG tablet Take 1 tablet by mouth daily      melatonin 3 MG TABS tablet Take 1 tablet by mouth nightly      montelukast (SINGULAIR) 10 MG tablet Take 1 tablet by mouth daily      alendronate (FOSAMAX) 70 MG tablet Take 1 tablet by mouth every 7 days Receives on Tuesday      ondansetron (ZOFRAN) 4 MG tablet       fluticasone (FLONASE) 50 MCG/ACT nasal spray 1 spray by Each Nostril route daily 1 Bottle 3

## 2025-04-08 ENCOUNTER — HOSPITAL ENCOUNTER (EMERGENCY)
Age: 89
Discharge: NURSING, REHAB OR LONG-TERM CARE | End: 2025-04-09
Payer: MEDICARE

## 2025-04-08 DIAGNOSIS — E86.0 DEHYDRATION: Primary | ICD-10-CM

## 2025-04-08 LAB
ALBUMIN SERPL-MCNC: 3.5 G/DL (ref 3.4–5)
ALBUMIN/GLOB SERPL: 1 {RATIO} (ref 1.1–2.2)
ALP SERPL-CCNC: 111 U/L (ref 40–129)
ALT SERPL-CCNC: 40 U/L (ref 10–40)
ANION GAP SERPL CALCULATED.3IONS-SCNC: 15 MMOL/L (ref 9–17)
AST SERPL-CCNC: 44 U/L (ref 15–37)
BASOPHILS # BLD: 0.02 K/UL
BASOPHILS NFR BLD: 0 % (ref 0–1)
BILIRUB SERPL-MCNC: 1.2 MG/DL (ref 0–1)
BUN SERPL-MCNC: 59 MG/DL (ref 7–20)
CALCIUM SERPL-MCNC: 11.3 MG/DL (ref 8.3–10.6)
CHLORIDE SERPL-SCNC: 102 MMOL/L (ref 99–110)
CO2 SERPL-SCNC: 18 MMOL/L (ref 21–32)
CREAT SERPL-MCNC: 1 MG/DL (ref 0.6–1.2)
EOSINOPHIL # BLD: 0.01 K/UL
EOSINOPHILS RELATIVE PERCENT: 0 % (ref 0–3)
ERYTHROCYTE [DISTWIDTH] IN BLOOD BY AUTOMATED COUNT: 21.7 % (ref 11.7–14.9)
GFR, ESTIMATED: 53 ML/MIN/1.73M2
GLUCOSE SERPL-MCNC: 130 MG/DL (ref 74–99)
HCT VFR BLD AUTO: 47.2 % (ref 37–47)
HGB BLD-MCNC: 15.2 G/DL (ref 12.5–16)
IMM GRANULOCYTES # BLD AUTO: 0.05 K/UL
IMM GRANULOCYTES NFR BLD: 1 %
LACTATE BLDV-SCNC: 2.2 MMOL/L (ref 0.4–2)
LYMPHOCYTES NFR BLD: 1.25 K/UL
LYMPHOCYTES RELATIVE PERCENT: 16 % (ref 24–44)
MCH RBC QN AUTO: 26.8 PG (ref 27–31)
MCHC RBC AUTO-ENTMCNC: 32.2 G/DL (ref 32–36)
MCV RBC AUTO: 83.1 FL (ref 78–100)
MONOCYTES NFR BLD: 0.63 K/UL
MONOCYTES NFR BLD: 8 % (ref 0–4)
NEUTROPHILS NFR BLD: 74 % (ref 36–66)
NEUTS SEG NFR BLD: 5.71 K/UL
PLATELET # BLD AUTO: 155 K/UL (ref 140–440)
PMV BLD AUTO: 10.5 FL (ref 7.5–11.1)
POTASSIUM SERPL-SCNC: 5.1 MMOL/L (ref 3.5–5.1)
PROT SERPL-MCNC: 7 G/DL (ref 6.4–8.2)
RBC # BLD AUTO: 5.68 M/UL (ref 4.2–5.4)
SODIUM SERPL-SCNC: 135 MMOL/L (ref 136–145)
WBC OTHER # BLD: 7.7 K/UL (ref 4–10.5)

## 2025-04-08 PROCEDURE — 2580000003 HC RX 258: Performed by: NURSE PRACTITIONER

## 2025-04-08 PROCEDURE — 96360 HYDRATION IV INFUSION INIT: CPT

## 2025-04-08 PROCEDURE — 80053 COMPREHEN METABOLIC PANEL: CPT

## 2025-04-08 PROCEDURE — 85025 COMPLETE CBC W/AUTO DIFF WBC: CPT

## 2025-04-08 PROCEDURE — 83605 ASSAY OF LACTIC ACID: CPT

## 2025-04-08 PROCEDURE — 96361 HYDRATE IV INFUSION ADD-ON: CPT

## 2025-04-08 PROCEDURE — 99284 EMERGENCY DEPT VISIT MOD MDM: CPT

## 2025-04-08 RX ORDER — ACETAMINOPHEN 160 MG/5ML
650 SUSPENSION ORAL ONCE
Status: DISCONTINUED | OUTPATIENT
Start: 2025-04-09 | End: 2025-04-09 | Stop reason: HOSPADM

## 2025-04-08 RX ORDER — 0.9 % SODIUM CHLORIDE 0.9 %
500 INTRAVENOUS SOLUTION INTRAVENOUS ONCE
Status: COMPLETED | OUTPATIENT
Start: 2025-04-08 | End: 2025-04-09

## 2025-04-08 RX ADMIN — SODIUM CHLORIDE 500 ML: 9 INJECTION, SOLUTION INTRAVENOUS at 22:03

## 2025-04-08 NOTE — ED TRIAGE NOTES
Health Maintenance Summary     Topic Due On Due Status Completed On    Colorectal Cancer Screening - Colonoscopy Dec 12, 2017 Overdue     MAMMOGRAM - BREAST CANCER SCREENING Nov 29, 2018 Not Due Nov 29, 2017    Pap Smear - Cervical Cancer Screening  Sep 19, 2019 Not Due Sep 19, 2014    Diabetes Eye Exam Jan 1, 2017 Overdue Jan 1, 2016    Glycohemoglobin A1C  (Diabetes Sugar)  Aug 9, 2018 Due Soon Feb 9, 2018    GFR  (Kidney Function Test)  Feb 9, 2019 Not Due Feb 9, 2018    Immunization - TDAP Pregnancy  Hidden     Diabetes Foot Exam  Apr 20, 2018 Overdue Apr 20, 2017    IMMUNIZATION - DTaP/Tdap/Td Apr 20, 2027 Not Due Apr 20, 2017    Immunization-Influenza  Completed Oct 3, 2017    Depression Screening Nov 16, 2018 Not Due Nov 16, 2017          Patient is due for topics as listed above, she wishes to discuss with provider .           Pt presents to the ed with medics. Per medics pt is aphasic and has altered mental status. Pt is altered at this time but is awake and alert but is not oriented to self or place. Pt is also unable to form proper words at this time and instead mumbles. Skin warm and dry at this time.

## 2025-04-08 NOTE — ED NOTES
This RN called [facility] and requested that the DNR-CC be sent to the hospital to have in the pt chart.

## 2025-04-09 VITALS
TEMPERATURE: 97.5 F | SYSTOLIC BLOOD PRESSURE: 105 MMHG | RESPIRATION RATE: 18 BRPM | OXYGEN SATURATION: 94 % | HEART RATE: 102 BPM | WEIGHT: 150 LBS | BODY MASS INDEX: 29.29 KG/M2 | DIASTOLIC BLOOD PRESSURE: 68 MMHG

## 2025-04-09 PROCEDURE — 96361 HYDRATE IV INFUSION ADD-ON: CPT

## 2025-04-09 ASSESSMENT — PAIN - FUNCTIONAL ASSESSMENT: PAIN_FUNCTIONAL_ASSESSMENT: NONE - DENIES PAIN

## 2025-04-09 NOTE — ED NOTES
Pt refusing care at this time. Pt states \"I don't need it! I don't need it!\" Then proceeds to swipe at this RN in an attempt to hit RN and removes vital sign equipment. RN attempts to educate pt on the importance of the vitals and blood work and meet pt needs but all efforts prove futile as the pt continues to shout at RN repeating \"Get out of here.\"

## 2025-04-09 NOTE — ED NOTES
RN at bedside attempting to administer pt her tylenol suspension and the pt is not allowing the RN to administer it. This RN attempts to give the medication and the pt swipes at this RN's hand.

## 2025-04-09 NOTE — ED PROVIDER NOTES
[facility] EMERGENCY DEPARTMENT  EMERGENCY DEPARTMENT ENCOUNTER        Pt Name: Marlene Snowden  MRN: [id number]  Birthdate 3/7/1930  Date of evaluation: 4/8/2025  Provider: MERRICK MCLEAN - CNP  PCP: Cheyanne Haider MD    PATTI. I have evaluated this patient.        Triage CHIEF COMPLAINT       Chief Complaint   Patient presents with    Altered Mental Status     Patient arrives from [facility] d/t dehydration per nursing staff at the facility.  Keshia at [facility] reports patient pulled out her IV and they were unable to start another line.  Stat labs were drawn but were coagulated and staff feel that patient is dehydrated.  Keshia also reports patient is altered from her baseline.  Patient is DNRCC and daughter requested patient be sent to the ED due to not having hospice care.         HISTORY OF PRESENT ILLNESS      Chief Complaint: Dehydration    Marlene Snowden is a 89 y.o. female who presents for evaluation of dehydration by EMS from [facility].  Per EMS they were advised that the patient has been having issues with dehydration.  They attempted to give her IV fluids this evening but were not able to start an IV after the patient pulled her IV out and sent her here for hydration.  Patient is not altered but is having difficulty speaking and not able to provide history.  Her daughter is at bedside and reports that she was contacted by the nurse practitioner at the facility who again expressed concern about dehydration and sent her here for this.  She reports that she has been declining over the last month after getting a UTI, has been bedbound for the last 2 weeks and generally weak with poor intake.  She is a DNR CC but is not under hospice at this time and she would like her to get labs and IV fluids.  Patient has not been having any diarrhea, vomiting.    Nursing Notes were all reviewed and agreed with or any disagreements were addressed in the HPI.    REVIEW OF

## 2025-04-09 NOTE — ED NOTES
Claire rolled and no ulcers were noted on the sacral region of the pt. Decubitus Ulcer noted on the right heel and RN dressed that wound with a mepolex dressing.
